# Patient Record
Sex: FEMALE | Race: WHITE | URBAN - METROPOLITAN AREA
[De-identification: names, ages, dates, MRNs, and addresses within clinical notes are randomized per-mention and may not be internally consistent; named-entity substitution may affect disease eponyms.]

---

## 2017-05-02 ENCOUNTER — IMPORTED ENCOUNTER (OUTPATIENT)
Dept: URBAN - METROPOLITAN AREA CLINIC 50 | Facility: CLINIC | Age: 72
End: 2017-05-02

## 2017-05-03 ENCOUNTER — IMPORTED ENCOUNTER (OUTPATIENT)
Dept: URBAN - METROPOLITAN AREA CLINIC 50 | Facility: CLINIC | Age: 72
End: 2017-05-03

## 2017-09-19 ENCOUNTER — IMPORTED ENCOUNTER (OUTPATIENT)
Dept: URBAN - METROPOLITAN AREA CLINIC 50 | Facility: CLINIC | Age: 72
End: 2017-09-19

## 2017-11-15 ENCOUNTER — IMPORTED ENCOUNTER (OUTPATIENT)
Dept: URBAN - METROPOLITAN AREA CLINIC 50 | Facility: CLINIC | Age: 72
End: 2017-11-15

## 2017-12-14 ENCOUNTER — IMPORTED ENCOUNTER (OUTPATIENT)
Dept: URBAN - METROPOLITAN AREA CLINIC 50 | Facility: CLINIC | Age: 72
End: 2017-12-14

## 2018-04-13 ENCOUNTER — IMPORTED ENCOUNTER (OUTPATIENT)
Dept: URBAN - METROPOLITAN AREA CLINIC 50 | Facility: CLINIC | Age: 73
End: 2018-04-13

## 2018-06-07 ENCOUNTER — IMPORTED ENCOUNTER (OUTPATIENT)
Dept: URBAN - METROPOLITAN AREA CLINIC 50 | Facility: CLINIC | Age: 73
End: 2018-06-07

## 2018-06-13 ENCOUNTER — IMPORTED ENCOUNTER (OUTPATIENT)
Dept: URBAN - METROPOLITAN AREA CLINIC 50 | Facility: CLINIC | Age: 73
End: 2018-06-13

## 2018-07-17 ENCOUNTER — IMPORTED ENCOUNTER (OUTPATIENT)
Dept: URBAN - METROPOLITAN AREA CLINIC 50 | Facility: CLINIC | Age: 73
End: 2018-07-17

## 2018-11-07 ENCOUNTER — IMPORTED ENCOUNTER (OUTPATIENT)
Dept: URBAN - METROPOLITAN AREA CLINIC 50 | Facility: CLINIC | Age: 73
End: 2018-11-07

## 2018-12-05 ENCOUNTER — IMPORTED ENCOUNTER (OUTPATIENT)
Dept: URBAN - METROPOLITAN AREA CLINIC 50 | Facility: CLINIC | Age: 73
End: 2018-12-05

## 2018-12-12 ENCOUNTER — TRANSFERRED RECORDS (OUTPATIENT)
Dept: HEALTH INFORMATION MANAGEMENT | Facility: CLINIC | Age: 73
End: 2018-12-12

## 2019-02-11 ENCOUNTER — IMPORTED ENCOUNTER (OUTPATIENT)
Dept: URBAN - METROPOLITAN AREA CLINIC 50 | Facility: CLINIC | Age: 74
End: 2019-02-11

## 2019-02-27 ENCOUNTER — IMPORTED ENCOUNTER (OUTPATIENT)
Dept: URBAN - METROPOLITAN AREA CLINIC 50 | Facility: CLINIC | Age: 74
End: 2019-02-27

## 2019-03-01 ENCOUNTER — TRANSFERRED RECORDS (OUTPATIENT)
Dept: HEALTH INFORMATION MANAGEMENT | Facility: CLINIC | Age: 74
End: 2019-03-01

## 2019-03-25 ENCOUNTER — IMPORTED ENCOUNTER (OUTPATIENT)
Dept: URBAN - METROPOLITAN AREA CLINIC 50 | Facility: CLINIC | Age: 74
End: 2019-03-25

## 2019-05-10 ENCOUNTER — IMPORTED ENCOUNTER (OUTPATIENT)
Dept: URBAN - METROPOLITAN AREA CLINIC 50 | Facility: CLINIC | Age: 74
End: 2019-05-10

## 2019-05-22 ENCOUNTER — IMPORTED ENCOUNTER (OUTPATIENT)
Dept: URBAN - METROPOLITAN AREA CLINIC 50 | Facility: CLINIC | Age: 74
End: 2019-05-22

## 2019-05-30 ENCOUNTER — IMPORTED ENCOUNTER (OUTPATIENT)
Dept: URBAN - METROPOLITAN AREA CLINIC 50 | Facility: CLINIC | Age: 74
End: 2019-05-30

## 2019-10-09 ENCOUNTER — IMPORTED ENCOUNTER (OUTPATIENT)
Dept: URBAN - METROPOLITAN AREA CLINIC 50 | Facility: CLINIC | Age: 74
End: 2019-10-09

## 2019-10-16 ENCOUNTER — IMPORTED ENCOUNTER (OUTPATIENT)
Dept: URBAN - METROPOLITAN AREA CLINIC 50 | Facility: CLINIC | Age: 74
End: 2019-10-16

## 2020-03-06 ENCOUNTER — IMPORTED ENCOUNTER (OUTPATIENT)
Dept: URBAN - METROPOLITAN AREA CLINIC 50 | Facility: CLINIC | Age: 75
End: 2020-03-06

## 2020-04-15 ENCOUNTER — IMPORTED ENCOUNTER (OUTPATIENT)
Dept: URBAN - METROPOLITAN AREA CLINIC 50 | Facility: CLINIC | Age: 75
End: 2020-04-15

## 2020-06-05 ENCOUNTER — IMPORTED ENCOUNTER (OUTPATIENT)
Dept: URBAN - METROPOLITAN AREA CLINIC 50 | Facility: CLINIC | Age: 75
End: 2020-06-05

## 2020-07-01 ENCOUNTER — IMPORTED ENCOUNTER (OUTPATIENT)
Dept: URBAN - METROPOLITAN AREA CLINIC 50 | Facility: CLINIC | Age: 75
End: 2020-07-01

## 2020-08-05 ENCOUNTER — IMPORTED ENCOUNTER (OUTPATIENT)
Dept: URBAN - METROPOLITAN AREA CLINIC 50 | Facility: CLINIC | Age: 75
End: 2020-08-05

## 2020-08-06 ENCOUNTER — IMPORTED ENCOUNTER (OUTPATIENT)
Dept: URBAN - METROPOLITAN AREA CLINIC 50 | Facility: CLINIC | Age: 75
End: 2020-08-06

## 2020-09-24 ENCOUNTER — TRANSFERRED RECORDS (OUTPATIENT)
Dept: HEALTH INFORMATION MANAGEMENT | Facility: CLINIC | Age: 75
End: 2020-09-24

## 2020-10-14 ENCOUNTER — IMPORTED ENCOUNTER (OUTPATIENT)
Dept: URBAN - METROPOLITAN AREA CLINIC 50 | Facility: CLINIC | Age: 75
End: 2020-10-14

## 2020-10-20 ENCOUNTER — IMPORTED ENCOUNTER (OUTPATIENT)
Dept: URBAN - METROPOLITAN AREA CLINIC 50 | Facility: CLINIC | Age: 75
End: 2020-10-20

## 2021-03-03 ENCOUNTER — IMPORTED ENCOUNTER (OUTPATIENT)
Dept: URBAN - METROPOLITAN AREA CLINIC 50 | Facility: CLINIC | Age: 76
End: 2021-03-03

## 2021-03-29 ENCOUNTER — IMPORTED ENCOUNTER (OUTPATIENT)
Dept: URBAN - METROPOLITAN AREA CLINIC 50 | Facility: CLINIC | Age: 76
End: 2021-03-29

## 2021-04-18 ASSESSMENT — PACHYMETRY
OD_CT_UM: 517
OS_CT_UM: 538
OS_CT_UM: 538
OD_CT_UM: 517
OS_CT_UM: 538
OD_CT_UM: 517
OS_CT_UM: 538
OS_CT_UM: 538
OD_CT_UM: 517
OS_CT_UM: 538
OS_CT_UM: 538
OD_CT_UM: 517
OS_CT_UM: 538
OD_CT_UM: 517
OS_CT_UM: 538
OD_CT_UM: 517
OS_CT_UM: 538
OS_CT_UM: 538
OD_CT_UM: 517
OD_CT_UM: 517
OS_CT_UM: 538
OD_CT_UM: 517
OS_CT_UM: 538
OD_CT_UM: 517
OS_CT_UM: 538
OD_CT_UM: 517
OS_CT_UM: 538
OD_CT_UM: 517
OS_CT_UM: 538
OD_CT_UM: 517
OS_CT_UM: 538
OD_CT_UM: 517
OS_CT_UM: 538
OS_CT_UM: 538
OD_CT_UM: 517

## 2021-04-18 ASSESSMENT — TONOMETRY
OS_IOP_MMHG: 17
OD_IOP_MMHG: 14
OS_IOP_MMHG: 12
OD_IOP_MMHG: 14
OD_IOP_MMHG: 16
OD_IOP_MMHG: 13
OS_IOP_MMHG: 14
OD_IOP_MMHG: 13
OS_IOP_MMHG: 14
OS_IOP_MMHG: 13
OD_IOP_MMHG: 12
OD_IOP_MMHG: 17
OS_IOP_MMHG: 12
OD_IOP_MMHG: 13
OS_IOP_MMHG: 15
OD_IOP_MMHG: 18
OD_IOP_MMHG: 13
OS_IOP_MMHG: 16
OD_IOP_MMHG: 12
OS_IOP_MMHG: 13
OD_IOP_MMHG: 14
OD_IOP_MMHG: 13
OS_IOP_MMHG: 12
OD_IOP_MMHG: 14
OD_IOP_MMHG: 18
OD_IOP_MMHG: 13
OD_IOP_MMHG: 14
OD_IOP_MMHG: 15
OD_IOP_MMHG: 15
OD_IOP_MMHG: 16
OS_IOP_MMHG: 15
OD_IOP_MMHG: 15
OD_IOP_MMHG: 17

## 2021-04-18 ASSESSMENT — VISUAL ACUITY
OS_SC: 20/40
OD_CC: 20/30
OD_CC: J1@ 13 IN
OS_SC: 20/50
OD_CC: 20/25-
OS_SC: 20/40-2
OD_PH: @ 17 IN
OS_PH: 20/30-2
OS_SC: 20/30-
OS_SC: 20/40
OS_SC: 20/30
OD_SC: 20/30
OD_CC: J1
OD_PH: @ 13 IN
OD_SC: 20/40
OD_CC: J1+@ 13 IN
OD_CC: J1+
OD_SC: 20/40
OD_PH: 20/25
OD_CC: J1+
OD_SC: 20/40-1
OS_CC: J1
OD_SC: 20/30-1
OD_SC: 20/40+2
OS_SC: 20/40-1
OD_CC: 20/30
OS_PH: 20/25-
OS_CC: 20/30
OS_CC: J1+@ 17 IN
OS_PH: 20/25-2
OS_PH: 20/30
OS_PH: 20/30
OS_CC: 20/30
OD_SC: 20/25-
OS_SC: 20/40-2
OS_CC: J1+
OD_SC: 20/50
OD_SC: 20/30+1
OS_SC: 20/50-2
OS_CC: J1+@ 13 IN
OS_PH: @ 17 IN
OS_CC: 20/25
OS_PH: @ 13 IN
OS_CC: J1@ 13 IN
OD_PH: 20/30+2
OD_SC: 20/30
OD_CC: J1+@ 17 IN
OS_PH: 20/30
OS_CC: J1+
OD_SC: 20/30
OS_SC: 20/30-1
OS_SC: 20/25-1

## 2021-08-09 ENCOUNTER — PREPPED CHART (OUTPATIENT)
Dept: URBAN - METROPOLITAN AREA CLINIC 52 | Facility: CLINIC | Age: 76
End: 2021-08-09

## 2021-08-11 ENCOUNTER — DIAGNOSTIC TESTING ONLY (OUTPATIENT)
Dept: URBAN - METROPOLITAN AREA CLINIC 52 | Facility: CLINIC | Age: 76
End: 2021-08-11

## 2021-08-11 DIAGNOSIS — H47.233: ICD-10-CM

## 2021-08-11 PROCEDURE — 92273 FULL FIELD ERG W/I&R: CPT

## 2021-08-18 ENCOUNTER — DIAGNOSTICS - HVF/OCT (OUTPATIENT)
Dept: URBAN - METROPOLITAN AREA CLINIC 53 | Facility: CLINIC | Age: 76
End: 2021-08-18

## 2021-08-18 DIAGNOSIS — H40.1130: ICD-10-CM

## 2021-08-18 PROCEDURE — 92133 CPTRZD OPH DX IMG PST SGM ON: CPT

## 2021-08-18 PROCEDURE — 92083 EXTENDED VISUAL FIELD XM: CPT

## 2021-08-25 ENCOUNTER — 5 MONTH FOLLOW-UP (OUTPATIENT)
Dept: URBAN - METROPOLITAN AREA CLINIC 53 | Facility: CLINIC | Age: 76
End: 2021-08-25

## 2021-08-25 DIAGNOSIS — H40.1113: ICD-10-CM

## 2021-08-25 DIAGNOSIS — H40.1122: ICD-10-CM

## 2021-08-25 PROCEDURE — 92012 INTRM OPH EXAM EST PATIENT: CPT

## 2021-08-25 RX ORDER — PILOCARPINE HYDROCHLORIDE OPHTHALMIC SOLUTION 10 MG/ML: 1 SOLUTION/ DROPS OPHTHALMIC

## 2021-08-25 ASSESSMENT — VISUAL ACUITY
OD_SC: 20/40
OS_SC: 20/30
OU_SC: 20/30-1
OD_PH: 20/30
OU_SC: J1+

## 2021-08-25 ASSESSMENT — TONOMETRY
OD_IOP_MMHG: 15
OD_IOP_MMHG: 14
OS_IOP_MMHG: 13
OS_IOP_MMHG: 13

## 2021-08-25 NOTE — PATIENT DISCUSSION
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 IOP 13 today with worsening Visual Field. Patient on maxed medical therapy with advanced Visual Field loss. Will switch drop protocol and refer to Dr. Brenda Pride for evaluation of Glaucoma Filtration surgery vs other right eye then left eye. Target IOP: 10-11 OU.

## 2021-08-25 NOTE — PATIENT DISCUSSION
Amended 5/9/22: Patient aware Dr. Surinder Gonzalez is considered OON but she would like to follow up with him at this time.  Will refax referral.

## 2021-08-25 NOTE — PATIENT DISCUSSION
Dr. Katarzyna Valladares is out of network with patient's insurance plan.  Referred to Dr. Bulmaro Calix.

## 2021-08-25 NOTE — PATIENT DISCUSSION
Stop Latanoprost OU QHS, start Pilocarpine OU TID, continue Rhopressa OU QHS, Continue Vyzulta OU QHS, continue Combigan OU BID.

## 2021-08-25 NOTE — PATIENT DISCUSSION
Severe Primary Open Angle Glaucoma right eye s/p ECP OD 9/12/2012. IOP 15 today with worsening Visual Field. Patient on maxed medical therapy with advanced Visual Field loss. Will switch drop protocol and refer to Dr. Maicol Robin for evaluation of Glaucoma Filtration surgery vs other right eye then left eye. Target IOP: 10-11 OU.

## 2021-08-25 NOTE — PATIENT DISCUSSION
Amended Dr. Natasha Bonner does not take patients insurance and is consider OON will refer patient to Dr. Josefina Baxter.

## 2022-05-02 LAB — HEP C HIM: NORMAL

## 2022-06-02 ENCOUNTER — TRANSFERRED RECORDS (OUTPATIENT)
Dept: HEALTH INFORMATION MANAGEMENT | Facility: CLINIC | Age: 77
End: 2022-06-02

## 2022-06-02 LAB — EJECTION FRACTION: NORMAL %

## 2022-06-06 ENCOUNTER — TRANSFERRED RECORDS (OUTPATIENT)
Dept: HEALTH INFORMATION MANAGEMENT | Facility: CLINIC | Age: 77
End: 2022-06-06

## 2022-10-14 ENCOUNTER — TRANSFERRED RECORDS (OUTPATIENT)
Dept: HEALTH INFORMATION MANAGEMENT | Facility: CLINIC | Age: 77
End: 2022-10-14

## 2022-11-29 LAB
ALT SERPL-CCNC: 16 IU/L (ref 0–32)
AST SERPL-CCNC: 21 IU/L (ref 0–40)
CHOLESTEROL (EXTERNAL): 142 MG/DL (ref 100–199)
CREATININE (EXTERNAL): 0.84 MG/DL (ref 0.57–1)
GFR ESTIMATED (EXTERNAL): 71 ML/MIN/1.73
GLUCOSE (EXTERNAL): 93 MG/DL (ref 70–99)
HDLC SERPL-MCNC: 30 MG/DL
LDL CHOLESTEROL CALCULATED (EXTERNAL): 80 MG/DL (ref 0–99)
POTASSIUM (EXTERNAL): 4.5 MMOL/L (ref 3.5–5.2)
TRIGLYCERIDES (EXTERNAL): 186 MG/DL (ref 0–149)
TSH SERPL-ACNC: 3.47 UIU/ML (ref 0.45–4.5)

## 2023-03-02 ENCOUNTER — TRANSFERRED RECORDS (OUTPATIENT)
Dept: HEALTH INFORMATION MANAGEMENT | Facility: CLINIC | Age: 78
End: 2023-03-02

## 2023-06-18 ENCOUNTER — TRANSFERRED RECORDS (OUTPATIENT)
Dept: HEALTH INFORMATION MANAGEMENT | Facility: CLINIC | Age: 78
End: 2023-06-18

## 2023-06-19 ENCOUNTER — TRANSFERRED RECORDS (OUTPATIENT)
Dept: HEALTH INFORMATION MANAGEMENT | Facility: CLINIC | Age: 78
End: 2023-06-19

## 2023-06-19 LAB — EJECTION FRACTION: NORMAL %

## 2023-06-20 ENCOUNTER — TRANSFERRED RECORDS (OUTPATIENT)
Dept: HEALTH INFORMATION MANAGEMENT | Facility: CLINIC | Age: 78
End: 2023-06-20

## 2023-07-02 ENCOUNTER — TRANSFERRED RECORDS (OUTPATIENT)
Dept: HEALTH INFORMATION MANAGEMENT | Facility: CLINIC | Age: 78
End: 2023-07-02

## 2023-07-03 ENCOUNTER — TRANSFERRED RECORDS (OUTPATIENT)
Dept: HEALTH INFORMATION MANAGEMENT | Facility: CLINIC | Age: 78
End: 2023-07-03

## 2023-07-04 ENCOUNTER — TRANSFERRED RECORDS (OUTPATIENT)
Dept: HEALTH INFORMATION MANAGEMENT | Facility: CLINIC | Age: 78
End: 2023-07-04

## 2023-07-06 ENCOUNTER — TRANSFERRED RECORDS (OUTPATIENT)
Dept: HEALTH INFORMATION MANAGEMENT | Facility: CLINIC | Age: 78
End: 2023-07-06

## 2023-07-08 ENCOUNTER — TRANSFERRED RECORDS (OUTPATIENT)
Dept: HEALTH INFORMATION MANAGEMENT | Facility: CLINIC | Age: 78
End: 2023-07-08

## 2023-08-02 ENCOUNTER — TRANSFERRED RECORDS (OUTPATIENT)
Dept: HEALTH INFORMATION MANAGEMENT | Facility: CLINIC | Age: 78
End: 2023-08-02

## 2023-08-07 ENCOUNTER — TRANSFERRED RECORDS (OUTPATIENT)
Dept: HEALTH INFORMATION MANAGEMENT | Facility: CLINIC | Age: 78
End: 2023-08-07

## 2023-08-07 LAB
ALT SERPL-CCNC: 23 IU/L (ref 0–32)
AST SERPL-CCNC: 21 IU/L (ref 0–40)
CHOLESTEROL (EXTERNAL): 151 MG/DL (ref 100–199)
CREATININE (EXTERNAL): 0.66 MG/DL (ref 0.57–1)
GFR ESTIMATED (EXTERNAL): 89 ML/MIN/1.73
GLUCOSE (EXTERNAL): 100 MG/DL (ref 70–99)
HDLC SERPL-MCNC: 37 MG/DL
LDL CHOLESTEROL CALCULATED (EXTERNAL): 90 MG/DL (ref 0–99)
POTASSIUM (EXTERNAL): 4.2 MMOL/L (ref 3.5–5.2)
TRIGLYCERIDES (EXTERNAL): 133 MG/DL (ref 0–149)
TSH SERPL-ACNC: 3.74 UIU/ML (ref 0.45–4.5)

## 2023-08-17 ENCOUNTER — TRANSFERRED RECORDS (OUTPATIENT)
Dept: HEALTH INFORMATION MANAGEMENT | Facility: CLINIC | Age: 78
End: 2023-08-17

## 2023-09-07 ENCOUNTER — TRANSFERRED RECORDS (OUTPATIENT)
Dept: HEALTH INFORMATION MANAGEMENT | Facility: CLINIC | Age: 78
End: 2023-09-07

## 2023-09-10 ENCOUNTER — TRANSFERRED RECORDS (OUTPATIENT)
Dept: HEALTH INFORMATION MANAGEMENT | Facility: CLINIC | Age: 78
End: 2023-09-10

## 2023-09-20 ENCOUNTER — TRANSFERRED RECORDS (OUTPATIENT)
Dept: HEALTH INFORMATION MANAGEMENT | Facility: CLINIC | Age: 78
End: 2023-09-20

## 2023-10-02 ENCOUNTER — OFFICE VISIT (OUTPATIENT)
Dept: FAMILY MEDICINE | Facility: CLINIC | Age: 78
End: 2023-10-02
Payer: COMMERCIAL

## 2023-10-02 VITALS
TEMPERATURE: 97.8 F | WEIGHT: 156 LBS | SYSTOLIC BLOOD PRESSURE: 164 MMHG | OXYGEN SATURATION: 92 % | HEART RATE: 76 BPM | RESPIRATION RATE: 18 BRPM | DIASTOLIC BLOOD PRESSURE: 80 MMHG

## 2023-10-02 DIAGNOSIS — B35.1 ONYCHOMYCOSIS: ICD-10-CM

## 2023-10-02 DIAGNOSIS — M54.6 ACUTE RIGHT-SIDED THORACIC BACK PAIN: Primary | ICD-10-CM

## 2023-10-02 DIAGNOSIS — R31.21 ASYMPTOMATIC MICROSCOPIC HEMATURIA: ICD-10-CM

## 2023-10-02 LAB
ALBUMIN UR-MCNC: NEGATIVE MG/DL
APPEARANCE UR: CLEAR
BACTERIA #/AREA URNS HPF: ABNORMAL /HPF
BILIRUB UR QL STRIP: NEGATIVE
COLOR UR AUTO: YELLOW
GLUCOSE UR STRIP-MCNC: NEGATIVE MG/DL
HGB UR QL STRIP: ABNORMAL
KETONES UR STRIP-MCNC: NEGATIVE MG/DL
LEUKOCYTE ESTERASE UR QL STRIP: ABNORMAL
NITRATE UR QL: NEGATIVE
PH UR STRIP: 7 [PH] (ref 5–8)
RBC #/AREA URNS AUTO: ABNORMAL /HPF
SP GR UR STRIP: 1.01 (ref 1–1.03)
SQUAMOUS #/AREA URNS AUTO: ABNORMAL /LPF
UROBILINOGEN UR STRIP-ACNC: 0.2 E.U./DL
WBC #/AREA URNS AUTO: ABNORMAL /HPF

## 2023-10-02 PROCEDURE — 81001 URINALYSIS AUTO W/SCOPE: CPT | Performed by: PHYSICIAN ASSISTANT

## 2023-10-02 PROCEDURE — 99203 OFFICE O/P NEW LOW 30 MIN: CPT | Performed by: PHYSICIAN ASSISTANT

## 2023-10-02 RX ORDER — CHOLECALCIFEROL (VITAMIN D3) 50 MCG
1 TABLET ORAL 2 TIMES DAILY
COMMUNITY

## 2023-10-02 RX ORDER — RALOXIFENE HYDROCHLORIDE 60 MG/1
60 TABLET, FILM COATED ORAL DAILY
COMMUNITY
End: 2023-12-22

## 2023-10-02 RX ORDER — MULTIPLE VITAMINS W/ MINERALS TAB 9MG-400MCG
1 TAB ORAL DAILY
COMMUNITY

## 2023-10-02 RX ORDER — NETARSUDIL 0.2 MG/ML
1 SOLUTION/ DROPS OPHTHALMIC; TOPICAL AT BEDTIME
COMMUNITY

## 2023-10-02 RX ORDER — AMPICILLIN TRIHYDRATE 250 MG
600 CAPSULE ORAL DAILY
COMMUNITY

## 2023-10-02 RX ORDER — ACETAMINOPHEN 500 MG
500-1000 TABLET ORAL EVERY 6 HOURS PRN
COMMUNITY

## 2023-10-02 RX ORDER — OMEGA-3/DHA/EPA/FISH OIL 60 MG-90MG
1 CAPSULE ORAL 2 TIMES DAILY
COMMUNITY

## 2023-10-02 RX ORDER — PILOCARPINE HYDROCHLORIDE 10 MG/ML
1-2 SOLUTION/ DROPS OPHTHALMIC 4 TIMES DAILY
COMMUNITY
End: 2023-12-22

## 2023-10-02 RX ORDER — METOPROLOL TARTRATE 25 MG/1
50 TABLET, FILM COATED ORAL 2 TIMES DAILY
COMMUNITY
End: 2024-01-22

## 2023-10-02 RX ORDER — LATANOPROST 50 UG/ML
1 SOLUTION/ DROPS OPHTHALMIC DAILY
COMMUNITY

## 2023-10-02 RX ORDER — UBIDECARENONE 30 MG
1 CAPSULE ORAL DAILY
COMMUNITY

## 2023-10-02 RX ORDER — FENOFIBRATE 160 MG/1
160 TABLET ORAL DAILY
COMMUNITY
End: 2024-08-19

## 2023-10-02 RX ORDER — DILTIAZEM HYDROCHLORIDE 30 MG/1
30 TABLET, FILM COATED ORAL 4 TIMES DAILY
COMMUNITY
End: 2023-12-22

## 2023-10-02 RX ORDER — VITAMIN E (DL,TOCOPHERYL ACET) 90 MG
200 CAPSULE ORAL DAILY
COMMUNITY

## 2023-10-02 RX ORDER — ASPIRIN 81 MG/1
81 TABLET, CHEWABLE ORAL DAILY
COMMUNITY
End: 2023-12-27

## 2023-10-02 RX ORDER — LEVOTHYROXINE SODIUM 50 UG/1
50 TABLET ORAL DAILY
COMMUNITY
End: 2024-08-19

## 2023-10-02 RX ORDER — TRAZODONE HYDROCHLORIDE 150 MG/1
150 TABLET ORAL AT BEDTIME
COMMUNITY
End: 2023-12-22

## 2023-10-02 RX ORDER — BRIMONIDINE TARTRATE 2 MG/ML
1 SOLUTION/ DROPS OPHTHALMIC 3 TIMES DAILY
COMMUNITY

## 2023-10-02 NOTE — PROGRESS NOTES
Chief Complaint   Patient presents with    Back Pain     Mid back muscle spasms         ASSESSMENT/PLAN:  Abbey was seen today for back pain.    Diagnoses and all orders for this visit:    Acute right-sided thoracic back pain  -     UA Macroscopic with reflex to Microscopic and Culture - Clinic Collect  -     UA Microscopic with Reflex to Culture    Onychomycosis  -     Orthopedic  Referral; Future    Asymptomatic microscopic hematuria  -     Primary Care Referral; Future    Patient new to [place] and will refer to primary care to establish care and also to follow-up with the asymptomatic hematuria found on exam today.  To podiatry for onychomycosis    Patient is UA is not concerning for UTI but does have quite a bit of blood that is microscopic.  She is on Xarelto.  I doubt PE given the lack of tachycardia and shortness of breath.  She has reproducible pain in the right mid back which is quite suggestive of musculoskeletal cause.  Did discuss potentially doing a CT scan to rule out kidney stone however I would expect her feeling significantly more pain than she is currently.  We agreed to manage conservatively with Tylenol, heat, warm bath, massage, gentle range of motion and also have her follow-up with PT  If she has any new or worsening symptoms please return to clinic    Heriberto Patton PA-C      SUBJECTIVE:  Abbey is a 88 year old female who presents to urgent care with about 2 days of right upper back pain.  She just flew in and moved here from [place] to be closer to family.  During this summer she fell and broke her hip and had complications with pulmonary embolism after.  She has been on Xarelto.  No shortness of breath or chest pain.  Pain started during the flight and she pointed to being in a very uncomfortable wheelchair.  Since then she will get spasms of pain with some low-level underlying pain in general.  It is causing her to be more worn out.  No fevers or chills.  No urinary  symptoms.  No abdominal pain, nausea or vomiting.    ROS: Pertinent ROS neg other than the symptoms noted above in the HPI.     OBJECTIVE:  BP (!) 164/80 (BP Location: Right arm, Patient Position: Sitting, Cuff Size: Adult Regular)   Pulse 76   Temp 97.8  F (36.6  C) (Oral)   Resp 18   Wt 70.8 kg (156 lb)   SpO2 92%    GENERAL: healthy, alert and no distress  RESP: lungs clear to auscultation - no rales, rhonchi or wheezes  CV: regular rate and rhythm, normal S1 S2, no S3 or S4, no murmur, click or rub, no peripheral edema and peripheral pulses strong  MS: no gross musculoskeletal defects noted, no edema  SKIN: no suspicious lesions or rashes  BACK: No midline tenderness, right mid thoracic back has some hypertonicity of the muscles with focal tenderness reproduces the pain that brings her in, no CVA tenderness    DIAGNOSTICS    Results for orders placed or performed in visit on 10/02/23   UA Macroscopic with reflex to Microscopic and Culture - Clinic Collect     Status: Abnormal    Specimen: Urine, Clean Catch   Result Value Ref Range    Color Urine Yellow Colorless, Straw, Light Yellow, Yellow    Appearance Urine Clear Clear    Glucose Urine Negative Negative mg/dL    Bilirubin Urine Negative Negative    Ketones Urine Negative Negative mg/dL    Specific Gravity Urine 1.010 1.005 - 1.030    Blood Urine Large (A) Negative    pH Urine 7.0 5.0 - 8.0    Protein Albumin Urine Negative Negative mg/dL    Urobilinogen Urine 0.2 0.2, 1.0 E.U./dL    Nitrite Urine Negative Negative    Leukocyte Esterase Urine Small (A) Negative   UA Microscopic with Reflex to Culture     Status: Abnormal   Result Value Ref Range    Bacteria Urine Few (A) None Seen /HPF    RBC Urine  (A) 0-2 /HPF /HPF    WBC Urine 0-5 0-5 /HPF /HPF    Squamous Epithelials Urine Few (A) None Seen /LPF    Narrative    Urine Culture not indicated        Current Outpatient Medications   Medication    acetaminophen (TYLENOL) 500 MG tablet    apixaban  ANTICOAGULANT (ELIQUIS) 2.5 MG tablet    aspirin (ASA) 81 MG chewable tablet    brimonidine (ALPHAGAN) 0.2 % ophthalmic solution    calcium carbonate (OS-TAMIKO) 1500 (600 Ca) MG tablet    cholecalciferol (VITAMIN D3) 25 mcg (1000 units) capsule    coenzyme Q-10 capsule    diltiazem (CARDIZEM) 30 MG tablet    fenofibrate (TRIGLIDE/LOFIBRA) 160 MG tablet    fish oil-omega-3 fatty acids 500 MG capsule    latanoprost (XALATAN) 0.005 % ophthalmic solution    levothyroxine (SYNTHROID/LEVOTHROID) 50 MCG tablet    metoprolol tartrate (LOPRESSOR) 25 MG tablet    multivitamin w/minerals (THERA-VIT-M) tablet    netarsudil (RHOPRESSA) 0.02 % ophthalmic solution    pilocarpine (PILOCAR) 1 % ophthalmic solution    raloxifene (EVISTA) 60 MG tablet    red yeast rice 600 MG CAPS    traZODone (DESYREL) 150 MG tablet    vitamin D3 (CHOLECALCIFEROL) 50 mcg (2000 units) tablet    Vitamin E 90 MG (200 UNIT) capsule     No current facility-administered medications for this visit.      There is no problem list on file for this patient.     No past medical history on file.  No past surgical history on file.  No family history on file.  Social History     Tobacco Use    Smoking status: Not on file    Smokeless tobacco: Not on file   Substance Use Topics    Alcohol use: Not on file              The plan of care was discussed with the patient. They understand and agree with the course of treatment prescribed. A printed summary was given including instructions and medications.  The use of Dragon/[employer] dictation services may have been used to construct the content in this note; any grammatical or spelling errors are non-intentional. Please contact the author of this note directly if you are in need of any clarification.

## 2023-10-02 NOTE — PATIENT INSTRUCTIONS
Your exam is very consistent with a muscle strain.  I recommend heating the area with warm compresses, heating pads or taking long warm baths with Epsom salt and magnesium  You can use Tylenol 500 to 1000 mg up to 3 times a day as needed for pain  Massage the area, gentle range of motion and stretching can be helpful as well    Please bring back the urine sample so we can test this.  It is important to rule out other causes of back pain

## 2023-10-22 ENCOUNTER — HEALTH MAINTENANCE LETTER (OUTPATIENT)
Age: 78
End: 2023-10-22

## 2023-12-22 ENCOUNTER — OFFICE VISIT (OUTPATIENT)
Dept: FAMILY MEDICINE | Facility: CLINIC | Age: 78
End: 2023-12-22
Payer: COMMERCIAL

## 2023-12-22 VITALS
HEART RATE: 76 BPM | TEMPERATURE: 97.9 F | DIASTOLIC BLOOD PRESSURE: 108 MMHG | HEIGHT: 65 IN | RESPIRATION RATE: 20 BRPM | OXYGEN SATURATION: 97 % | SYSTOLIC BLOOD PRESSURE: 181 MMHG | WEIGHT: 160.1 LBS | BODY MASS INDEX: 26.67 KG/M2

## 2023-12-22 DIAGNOSIS — E03.9 HYPOTHYROIDISM, UNSPECIFIED TYPE: Primary | ICD-10-CM

## 2023-12-22 DIAGNOSIS — G47.00 INSOMNIA, UNSPECIFIED TYPE: ICD-10-CM

## 2023-12-22 DIAGNOSIS — M81.0 AGE-RELATED OSTEOPOROSIS WITHOUT CURRENT PATHOLOGICAL FRACTURE: ICD-10-CM

## 2023-12-22 DIAGNOSIS — I26.99 ACUTE PULMONARY EMBOLISM WITHOUT ACUTE COR PULMONALE, UNSPECIFIED PULMONARY EMBOLISM TYPE (H): ICD-10-CM

## 2023-12-22 DIAGNOSIS — I48.20 CHRONIC ATRIAL FIBRILLATION (H): ICD-10-CM

## 2023-12-22 LAB
ALBUMIN SERPL BCG-MCNC: 4 G/DL (ref 3.5–5.2)
ALBUMIN UR-MCNC: ABNORMAL MG/DL
ALP SERPL-CCNC: 68 U/L (ref 40–150)
ALT SERPL W P-5'-P-CCNC: 16 U/L (ref 0–50)
AMORPH CRY #/AREA URNS HPF: ABNORMAL /HPF
ANION GAP SERPL CALCULATED.3IONS-SCNC: 9 MMOL/L (ref 7–15)
APPEARANCE UR: ABNORMAL
AST SERPL W P-5'-P-CCNC: 22 U/L (ref 0–45)
BACTERIA #/AREA URNS HPF: ABNORMAL /HPF
BILIRUB SERPL-MCNC: 0.3 MG/DL
BILIRUB UR QL STRIP: NEGATIVE
BUN SERPL-MCNC: 23.9 MG/DL (ref 8–23)
CALCIUM SERPL-MCNC: 10 MG/DL (ref 8.8–10.2)
CHLORIDE SERPL-SCNC: 100 MMOL/L (ref 98–107)
CHOLEST SERPL-MCNC: 149 MG/DL
COLOR UR AUTO: YELLOW
CREAT SERPL-MCNC: 0.64 MG/DL (ref 0.51–0.95)
DEPRECATED HCO3 PLAS-SCNC: 28 MMOL/L (ref 22–29)
EGFRCR SERPLBLD CKD-EPI 2021: 89 ML/MIN/1.73M2
ERYTHROCYTE [DISTWIDTH] IN BLOOD BY AUTOMATED COUNT: 13.3 % (ref 10–15)
FASTING STATUS PATIENT QL REPORTED: ABNORMAL
GLUCOSE SERPL-MCNC: 102 MG/DL (ref 70–99)
GLUCOSE UR STRIP-MCNC: NEGATIVE MG/DL
HCT VFR BLD AUTO: 42.1 % (ref 35–47)
HDLC SERPL-MCNC: 32 MG/DL
HGB BLD-MCNC: 14.8 G/DL (ref 11.7–15.7)
HGB UR QL STRIP: ABNORMAL
KETONES UR STRIP-MCNC: NEGATIVE MG/DL
LDLC SERPL CALC-MCNC: 69 MG/DL
LEUKOCYTE ESTERASE UR QL STRIP: ABNORMAL
MCH RBC QN AUTO: 34.3 PG (ref 26.5–33)
MCHC RBC AUTO-ENTMCNC: 35.2 G/DL (ref 31.5–36.5)
MCV RBC AUTO: 98 FL (ref 78–100)
NITRATE UR QL: NEGATIVE
NONHDLC SERPL-MCNC: 117 MG/DL
PH UR STRIP: 7 [PH] (ref 5–8)
PLATELET # BLD AUTO: 363 10E3/UL (ref 150–450)
POTASSIUM SERPL-SCNC: 4.3 MMOL/L (ref 3.4–5.3)
PROT SERPL-MCNC: 6.5 G/DL (ref 6.4–8.3)
RBC # BLD AUTO: 4.32 10E6/UL (ref 3.8–5.2)
RBC #/AREA URNS AUTO: ABNORMAL /HPF
SODIUM SERPL-SCNC: 137 MMOL/L (ref 135–145)
SP GR UR STRIP: 1.01 (ref 1–1.03)
SQUAMOUS #/AREA URNS AUTO: ABNORMAL /LPF
TRIGL SERPL-MCNC: 239 MG/DL
UROBILINOGEN UR STRIP-ACNC: 0.2 E.U./DL
WBC # BLD AUTO: 8.6 10E3/UL (ref 4–11)
WBC #/AREA URNS AUTO: ABNORMAL /HPF

## 2023-12-22 PROCEDURE — 80061 LIPID PANEL: CPT | Performed by: FAMILY MEDICINE

## 2023-12-22 PROCEDURE — 99214 OFFICE O/P EST MOD 30 MIN: CPT | Performed by: FAMILY MEDICINE

## 2023-12-22 PROCEDURE — 81001 URINALYSIS AUTO W/SCOPE: CPT | Performed by: FAMILY MEDICINE

## 2023-12-22 PROCEDURE — 80053 COMPREHEN METABOLIC PANEL: CPT | Performed by: FAMILY MEDICINE

## 2023-12-22 PROCEDURE — 85027 COMPLETE CBC AUTOMATED: CPT | Performed by: FAMILY MEDICINE

## 2023-12-22 PROCEDURE — 36415 COLL VENOUS BLD VENIPUNCTURE: CPT | Performed by: FAMILY MEDICINE

## 2023-12-22 RX ORDER — TRAZODONE HYDROCHLORIDE 150 MG/1
300-450 TABLET ORAL AT BEDTIME
Qty: 90 TABLET | Refills: 0 | Status: SHIPPED | OUTPATIENT
Start: 2023-12-22 | End: 2024-01-22

## 2023-12-22 RX ORDER — RESPIRATORY SYNCYTIAL VIRUS VACCINE 120MCG/0.5
0.5 KIT INTRAMUSCULAR ONCE
Qty: 1 EACH | Refills: 0 | Status: CANCELLED | OUTPATIENT
Start: 2023-12-22 | End: 2023-12-22

## 2023-12-22 RX ORDER — RALOXIFENE HYDROCHLORIDE 60 MG/1
60 TABLET, FILM COATED ORAL DAILY
Qty: 90 TABLET | Refills: 1 | Status: SHIPPED | OUTPATIENT
Start: 2023-12-22 | End: 2024-06-21

## 2023-12-22 ASSESSMENT — PAIN SCALES - GENERAL: PAINLEVEL: NO PAIN (0)

## 2023-12-22 NOTE — PROGRESS NOTES
"  Assessment & Plan     Hypothyroidism, unspecified type  Chronic, stable on 50 mcg per day. Consider recheck if continued symptoms.   - Lipid panel reflex to direct LDL Non-fasting  - CBC with platelets  - Comprehensive metabolic panel (BMP + Alb, Alk Phos, ALT, AST, Total. Bili, TP)  - UA Macroscopic with reflex to Microscopic and Culture - Lab Collect  - Lipid panel reflex to direct LDL Non-fasting  - CBC with platelets  - Comprehensive metabolic panel (BMP + Alb, Alk Phos, ALT, AST, Total. Bili, TP)  - UA Macroscopic with reflex to Microscopic and Culture - Lab Collect  - UA Microscopic with Reflex to Culture    Insomnia, unspecified type  Chronic, stable. Refills as below.   - REVIEW OF HEALTH MAINTENANCE PROTOCOL ORDERS  - traZODone (DESYREL) 150 MG tablet  Dispense: 90 tablet; Refill: 0    Chronic atrial fibrillation (H)  Chronic, stable. Recommend follow up with cardiology.   - Adult Cardiology Eval  Referral    Age-related osteoporosis without current pathological fracture  Chronic, stable. Has had recent DEXA scan one year ago with improvement.   - raloxifene (EVISTA) 60 MG tablet  Dispense: 90 tablet; Refill: 1    Acute pulmonary embolism without acute cor pulmonale, unspecified pulmonary embolism type (H)  Acute, hemodynamically stable. On eliquis 5 mg PO Q12H. Continue to follow.       Ordering of each unique test  Prescription drug management  I spent a total of 27 minutes on the day of the visit.   Time spent by me doing chart review, history and exam, documentation and further activities per the note       BMI:   Estimated body mass index is 27.06 kg/m  as calculated from the following:    Height as of this encounter: 1.638 m (5' 4.5\").    Weight as of this encounter: 72.6 kg (160 lb 1.6 oz).       See Patient Instructions    DO DUSTIN MAT[facility] BANDAR Potter is a 78 year old, presenting for the following health issues:  Establish Care and " "Hypertension        12/22/2023     1:00 PM   Additional Questions   Roomed by SADIE Stock   Accompanied by Daughter Hanna       History of Present Illness       Hypertension: She presents for follow up of hypertension.  She does not check blood pressure  regularly outside of the clinic. Outside blood pressures have been over 140/90. She does not follow a low salt diet.     Reason for visit:  Moved - establish new care, med refills, osteoporosis, cardiologist follow up referral    She eats 0-1 servings of fruits and vegetables daily.She consumes 0 sweetened beverage(s) daily.She exercises with enough effort to increase her heart rate 9 or less minutes per day.  She exercises with enough effort to increase her heart rate 3 or less days per week.   She is taking medications regularly.      Osteoporosis- patient broke hip in June. She was recommended to refer to a bone provider. She was previously managed by her primary care provider. Patient denies any side effects or symptoms. Patient also has another medication, she cannot recall the name but that it is an infusion that occurs every two years.     Insomnia- Patient has tried melatonin with no relief. She was up to 150-450 mg per night of trazodone. No side effects. She can fall asleep relatively soon, but has difficulty maintaining sleep.     History of orthostatic hypotension. Was told her normal is 150-160/90 mmHg.     Review of Systems   Constitutional, HEENT, cardiovascular, pulmonary, gi and gu systems are negative, except as otherwise noted.      Objective    BP (!) 181/108 (BP Location: Right arm, Patient Position: Sitting, Cuff Size: Adult Regular)   Pulse 76   Temp 97.9  F (36.6  C) (Oral)   Resp 20   Ht 1.638 m (5' 4.5\")   Wt 72.6 kg (160 lb 1.6 oz)   LMP  (LMP Unknown)   SpO2 97%   BMI 27.06 kg/m    Body mass index is 27.06 kg/m .  Physical Exam   GENERAL: healthy, alert and no distress  EYES: Eyes grossly normal to inspection, PERRL and " conjunctivae and sclerae normal  NECK: no adenopathy, no asymmetry, masses, or scars and thyroid normal to palpation  RESP: lungs clear to auscultation - no rales, rhonchi or wheezes  CV: regular rate and rhythm, normal S1 S2, no S3 or S4, no murmur, click or rub, no peripheral edema and peripheral pulses strong  MS: no gross musculoskeletal defects noted, no edema  PSYCH: mentation appears normal, affect normal/bright    Results for orders placed or performed in visit on 12/22/23   Lipid panel reflex to direct LDL Non-fasting     Status: Abnormal   Result Value Ref Range    Cholesterol 149 <200 mg/dL    Triglycerides 239 (H) <150 mg/dL    Direct Measure HDL 32 (L) >=50 mg/dL    LDL Cholesterol Calculated 69 <=100 mg/dL    Non HDL Cholesterol 117 <130 mg/dL    Patient Fasting > 8hrs? Unknown     Narrative    Cholesterol  Desirable:  <200 mg/dL    Triglycerides  Normal:  Less than 150 mg/dL  Borderline High:  150-199 mg/dL  High:  200-499 mg/dL  Very High:  Greater than or equal to 500 mg/dL    Direct Measure HDL  Female:  Greater than or equal to 50 mg/dL   Male:  Greater than or equal to 40 mg/dL    LDL Cholesterol  Desirable:  <100mg/dL  Above Desirable:  100-129 mg/dL   Borderline High:  130-159 mg/dL   High:  160-189 mg/dL   Very High:  >= 190 mg/dL    Non HDL Cholesterol  Desirable:  130 mg/dL  Above Desirable:  130-159 mg/dL  Borderline High:  160-189 mg/dL  High:  190-219 mg/dL  Very High:  Greater than or equal to 220 mg/dL   CBC with platelets     Status: Abnormal   Result Value Ref Range    WBC Count 8.6 4.0 - 11.0 10e3/uL    RBC Count 4.32 3.80 - 5.20 10e6/uL    Hemoglobin 14.8 11.7 - 15.7 g/dL    Hematocrit 42.1 35.0 - 47.0 %    MCV 98 78 - 100 fL    MCH 34.3 (H) 26.5 - 33.0 pg    MCHC 35.2 31.5 - 36.5 g/dL    RDW 13.3 10.0 - 15.0 %    Platelet Count 363 150 - 450 10e3/uL   Comprehensive metabolic panel (BMP + Alb, Alk Phos, ALT, AST, Total. Bili, TP)     Status: Abnormal   Result Value Ref Range     Sodium 137 135 - 145 mmol/L    Potassium 4.3 3.4 - 5.3 mmol/L    Carbon Dioxide (CO2) 28 22 - 29 mmol/L    Anion Gap 9 7 - 15 mmol/L    Urea Nitrogen 23.9 (H) 8.0 - 23.0 mg/dL    Creatinine 0.64 0.51 - 0.95 mg/dL    GFR Estimate 89 >60 mL/min/1.73m2    Calcium 10.0 8.8 - 10.2 mg/dL    Chloride 100 98 - 107 mmol/L    Glucose 102 (H) 70 - 99 mg/dL    Alkaline Phosphatase 68 40 - 150 U/L    AST 22 0 - 45 U/L    ALT 16 0 - 50 U/L    Protein Total 6.5 6.4 - 8.3 g/dL    Albumin 4.0 3.5 - 5.2 g/dL    Bilirubin Total 0.3 <=1.2 mg/dL   UA Macroscopic with reflex to Microscopic and Culture - Lab Collect     Status: Abnormal    Specimen: Urine, Clean Catch   Result Value Ref Range    Color Urine Yellow Colorless, Straw, Light Yellow, Yellow    Appearance Urine Slightly Cloudy (A) Clear    Glucose Urine Negative Negative mg/dL    Bilirubin Urine Negative Negative    Ketones Urine Negative Negative mg/dL    Specific Gravity Urine 1.015 1.005 - 1.030    Blood Urine Large (A) Negative    pH Urine 7.0 5.0 - 8.0    Protein Albumin Urine Trace (A) Negative mg/dL    Urobilinogen Urine 0.2 0.2, 1.0 E.U./dL    Nitrite Urine Negative Negative    Leukocyte Esterase Urine Trace (A) Negative   UA Microscopic with Reflex to Culture     Status: Abnormal   Result Value Ref Range    Bacteria Urine Few (A) None Seen /HPF    RBC Urine  (A) 0-2 /HPF /HPF    WBC Urine 0-5 0-5 /HPF /HPF    Squamous Epithelials Urine Few (A) None Seen /LPF    Amorphous Crystals Urine Few (A) None Seen /HPF    Narrative    Urine Culture not indicated

## 2023-12-22 NOTE — PATIENT INSTRUCTIONS
If you will be taking excederin, hold the vitamin E and omega-3 fish oil as that is blood thinning.   Stop the baby aspirin.     It was very nice to meet you. Please message me with a refill request two weeks ahead of time if possible.    If you have questions, please send us a message or call the clinic and I will be happy to have some one reach out.

## 2023-12-26 NOTE — RESULT ENCOUNTER NOTE
Hello -    Here are my comments about the recent results. All of your labs look excellent. There is no sign of low blood count, which I worry about in my geriatric patients.     There is some repeated blood in your urine. Did you mention you were working with a urologist on this? If not, I would like to send you, just to make sure we are not missing anything dire.     Please let us know if you have any questions or concerns.    Regards,  CATARINA ZAVALA, DO

## 2024-01-22 DIAGNOSIS — I48.20 CHRONIC ATRIAL FIBRILLATION (H): Primary | ICD-10-CM

## 2024-01-22 DIAGNOSIS — G47.00 INSOMNIA, UNSPECIFIED TYPE: ICD-10-CM

## 2024-01-22 NOTE — TELEPHONE ENCOUNTER
Medication Question or Refill        What medication are you calling about (include dose and sig)?:   trazadone 150 mg, metoprolol succinate 50mg tab      Preferred Pharmacy:   [id number] [address] AT [place] [address]  [address] [place] [id number]  Phone: [phone] Fax: [phone]      Controlled Substance Agreement on file:   CSA -- Patient Level:    CSA: None found at the patient level.       Who prescribed the medication?: Michelle Thompson, DO     Do you need a refill? Yes    When did you use the medication last? This morning    Patient offered an appointment? No    Do you have any questions or concerns?  No      Could we send this information to you in [facility] or would you prefer to receive a phone call?:   Patient would prefer a phone call   Okay to leave a detailed message?: Yes at Cell number on file:    Telephone Information:   Mobile [phone]

## 2024-01-23 RX ORDER — METOPROLOL TARTRATE 25 MG/1
50 TABLET, FILM COATED ORAL 2 TIMES DAILY
Qty: 180 TABLET | Refills: 0 | Status: SHIPPED | OUTPATIENT
Start: 2024-01-23 | End: 2024-01-30

## 2024-01-23 RX ORDER — TRAZODONE HYDROCHLORIDE 150 MG/1
300-450 TABLET ORAL AT BEDTIME
Qty: 90 TABLET | Refills: 0 | Status: SHIPPED | OUTPATIENT
Start: 2024-01-23 | End: 2024-03-25

## 2024-01-26 ENCOUNTER — TELEPHONE (OUTPATIENT)
Dept: INTERNAL MEDICINE | Facility: CLINIC | Age: 79
End: 2024-01-26
Payer: COMMERCIAL

## 2024-01-26 DIAGNOSIS — I48.20 CHRONIC ATRIAL FIBRILLATION (H): Primary | ICD-10-CM

## 2024-01-26 RX ORDER — METOPROLOL TARTRATE 50 MG
50 TABLET ORAL DAILY
Qty: 90 TABLET | Refills: 3 | Status: CANCELLED | OUTPATIENT
Start: 2024-01-26

## 2024-01-26 NOTE — TELEPHONE ENCOUNTER
Relayed MARK Barrera, message.    Pt states she would like to continue with 50mg tablet daily, as this is what she was taking prior. Pt would like a new prescription sent in so she does not have to take 2 25mg tablets daily.

## 2024-01-26 NOTE — TELEPHONE ENCOUNTER
Verbal order from MARK Barrera to keep patient at 25mg BID, as 50mg daily would only cover 12 hours, until PCP is back to review.     No where in chart is it listed that pt is on 50mg daily.     Pt ok to  25mg prescription and take BID until PCP can review.     No further questions at this time

## 2024-01-26 NOTE — TELEPHONE ENCOUNTER
Please call: It looks like it was entered in the computer as twice daily when transcribed. It does not appear we wanted to change the dose so if she was taking only one per day continue with this. I will have Dr. Thompson review when she is back to make sure she agrees.

## 2024-01-26 NOTE — TELEPHONE ENCOUNTER
Pt calling stating that the metoprolol tartrate (LOPRESSOR) 25 MG tablet is wrong, she stated it says to take 2 daily but she stated she has only taken 1. Please call pt back once reviewed.

## 2024-01-29 ENCOUNTER — MYC MEDICAL ADVICE (OUTPATIENT)
Dept: FAMILY MEDICINE | Facility: CLINIC | Age: 79
End: 2024-01-29
Payer: COMMERCIAL

## 2024-01-29 DIAGNOSIS — I48.20 CHRONIC ATRIAL FIBRILLATION (H): ICD-10-CM

## 2024-01-30 RX ORDER — METOPROLOL SUCCINATE 50 MG/1
50 TABLET, EXTENDED RELEASE ORAL DAILY
Qty: 90 TABLET | Refills: 1 | Status: SHIPPED | OUTPATIENT
Start: 2024-01-30

## 2024-02-01 DIAGNOSIS — G47.00 INSOMNIA, UNSPECIFIED TYPE: ICD-10-CM

## 2024-02-01 RX ORDER — TRAZODONE HYDROCHLORIDE 150 MG/1
300-450 TABLET ORAL AT BEDTIME
Qty: 90 TABLET | Refills: 0 | OUTPATIENT
Start: 2024-02-01

## 2024-02-05 RX ORDER — METOPROLOL SUCCINATE 50 MG/1
50 TABLET, EXTENDED RELEASE ORAL DAILY
Qty: 90 TABLET | Refills: 0 | Status: SHIPPED | OUTPATIENT
Start: 2024-02-05 | End: 2024-04-08

## 2024-02-20 ENCOUNTER — TELEPHONE (OUTPATIENT)
Dept: FAMILY MEDICINE | Facility: CLINIC | Age: 79
End: 2024-02-20
Payer: COMMERCIAL

## 2024-02-20 DIAGNOSIS — H52.4 PRESBYOPIA: Primary | ICD-10-CM

## 2024-02-20 NOTE — TELEPHONE ENCOUNTER
Order/Referral Request    Who is requesting: Insurance    Orders being requested: Retro active referral for 11/28/2023 appt and referral for upcoming appt (04/10/2024) from PCP    Reason service is needed/diagnosis: Claims denied by insurance and were advised to get referral from PCP    When are orders needed by: ASAP    Has this been discussed with Provider: No    Does patient have a preference on a Group/Provider/Facility? [facility]    Does patient have an appointment scheduled?: No    Where to send orders: Fax    ATTN: Kate    Could we send this information to you in [facility] or would you prefer to receive a phone call?:   Patient would prefer a phone call   Okay to leave a detailed message?: Yes at Other phone number:  [phone] extension [phone]

## 2024-02-20 NOTE — TELEPHONE ENCOUNTER
Patient would like referral placed for previous eye appointment. Are you willing to sign referral?

## 2024-02-27 NOTE — TELEPHONE ENCOUNTER
Abby with [facility] received referral for patient's April 2024 appointment.    Would like to know if PCP would reconsider and provide a referral for 11/28/23 service? Relayed to caller will request back dated referral for 11/28/23, but patient had not established care with Dr. Thompson until December 2023 so may not be possible.    Requesting referral is faxed to Attn: Kate at

## 2024-03-25 DIAGNOSIS — G47.00 INSOMNIA, UNSPECIFIED TYPE: ICD-10-CM

## 2024-03-25 RX ORDER — TRAZODONE HYDROCHLORIDE 150 MG/1
300-450 TABLET ORAL AT BEDTIME
Qty: 90 TABLET | Refills: 0 | Status: SHIPPED | OUTPATIENT
Start: 2024-03-25 | End: 2024-05-14

## 2024-03-25 NOTE — TELEPHONE ENCOUNTER
Patient calling to get a medication refill on medication(s) attached    Patient is currently has a couple left

## 2024-04-08 ENCOUNTER — OFFICE VISIT (OUTPATIENT)
Dept: CARDIOLOGY | Facility: CLINIC | Age: 79
End: 2024-04-08
Attending: FAMILY MEDICINE
Payer: COMMERCIAL

## 2024-04-08 VITALS
HEART RATE: 96 BPM | RESPIRATION RATE: 16 BRPM | SYSTOLIC BLOOD PRESSURE: 134 MMHG | BODY MASS INDEX: 28.54 KG/M2 | WEIGHT: 171.3 LBS | HEIGHT: 65 IN | DIASTOLIC BLOOD PRESSURE: 82 MMHG

## 2024-04-08 DIAGNOSIS — I10 ESSENTIAL HYPERTENSION, BENIGN: ICD-10-CM

## 2024-04-08 DIAGNOSIS — I26.99 ACUTE PULMONARY EMBOLISM, UNSPECIFIED PULMONARY EMBOLISM TYPE, UNSPECIFIED WHETHER ACUTE COR PULMONALE PRESENT (H): ICD-10-CM

## 2024-04-08 DIAGNOSIS — I48.0 PAROXYSMAL ATRIAL FIBRILLATION (H): Primary | ICD-10-CM

## 2024-04-08 PROCEDURE — 93000 ELECTROCARDIOGRAM COMPLETE: CPT | Performed by: INTERNAL MEDICINE

## 2024-04-08 PROCEDURE — 99204 OFFICE O/P NEW MOD 45 MIN: CPT | Performed by: INTERNAL MEDICINE

## 2024-04-08 RX ORDER — APIXABAN 5 MG/1
5 TABLET, FILM COATED ORAL
COMMUNITY
Start: 2024-02-09 | End: 2024-08-19

## 2024-04-08 NOTE — PROGRESS NOTES
[facility] Office Consult     Assessment:   (I48.0) Paroxysmal atrial fibrillation (H)  (primary encounter diagnosis)  Comment: Truly paroxysmal, most likely not valvular, and asymptomatic.  This is based on prior echo.  Will obtain her own echocardiogram.  Will continue Eliquis.  Her CHADS2 Vascor is at least 4.  Should she have recurrence would consider antiarrhythmic.  Continue metoprolol.  If she has bleeding might need to consider left atrial appendage occlusion.    (I10) Essential hypertension, benign  Comment: Under good control currently on metoprolol.    (I26.99) Acute pulmonary embolism, unspecified pulmonary embolism type, unspecified whether acute cor pulmonale present (H)  Comment: So noted, felt to be due to right hip fracture and DVT that developed thereafter.     Plan:   1.  Continue current meds.  2.  Echocardiogram.  3.  Follow-up me in 6 months or sooner if needed.  4.  Suggest she speak with her primary doctor about duration of blood thinner for the pulmonary embolus.  I would only think 3 to 6 months since it was provoked.  5.  An occasional Excedrin or nonsteroidal for severe pain but would use 500 mg of acetaminophen in the interim.    History of Present Illness:   Thank you for asking the [facility] Care team to see Bharti Peña a 79 year old female  in consultation  to evaluate atrial fibrillation.   Patient was living independently alone in the [place].  She was orthostatic after getting up and fell to the ground fracturing her right hip and subsequently having surgery.  Postop, she had a DVT with subsequent pulmonary emboli and some atrial fibrillation.  She could not feel the atrial fibrillation and denies any syncope, presyncope, chest pains, palpitations, significant shortness of breath, PND, with apnea or peripheral edema.    Past Medical History:     Past Medical History:   Diagnosis Date    Paroxysmal atrial fibrillation July 2023     Hypertension     Thyroid disease/hypothyroid acquired      Past history is negative for cancer, tuberculosis, diabetes mellitus, myocardial infarction,  rheumatic fever, cerebrovascular accident, chronic kidney disease, peptic ulcer disease, chronic obstructive pulmonary disease, and no lipid disorder.     Past Surgical History:     Past Surgical History:   Procedure Laterality Date    COLONOSCOPY      ORTHOPEDIC SURGERY       Family History:   Family history negative for coronary artery disease.  Family History   Problem Relation Age of Onset    Hypertension Brother      Social History:   She is , lives alone independently in senior housing, used to smoke less than a pack a day for 10 years quitting this 50 years ago, reports that she has never smoked. She has never used smokeless tobacco. She reports that she does not currently use alcohol. She reports that she does not currently use drugs. The primary care physician is Michelle Thompson    Meds:   Scheduled Meds:  Current Outpatient Medications   Medication Sig Dispense Refill    acetaminophen (TYLENOL) 500 MG tablet Take 500-1,000 mg by mouth every 6 hours as needed for mild pain      brimonidine (ALPHAGAN) 0.2 % ophthalmic solution Place 1 drop into both eyes 3 times daily      calcium carbonate (OS-TAMIKO) 1500 (600 Ca) MG tablet Take 600 mg by mouth 2 times daily (with meals)      cholecalciferol (VITAMIN D3) 25 mcg (1000 units) capsule Take 1 capsule by mouth daily Evening      coenzyme Q-10 capsule Take 1 capsule by mouth daily  120 mg      ELIQUIS ANTICOAGULANT 5 MG tablet Take 5 mg by mouth 2 times daily      fenofibrate (TRIGLIDE/LOFIBRA) 160 MG tablet Take 160 mg by mouth daily      fish oil-omega-3 fatty acids 500 MG capsule Take 1 capsule by mouth 2 times daily      latanoprost (XALATAN) 0.005 % ophthalmic solution Place 1 drop into both eyes daily      levothyroxine (SYNTHROID/LEVOTHROID) 50 MCG tablet Take 50 mcg by mouth daily       "metoprolol succinate ER (TOPROL XL) 50 MG 24 hr tablet Take 1 tablet (50 mg) by mouth daily 90 tablet 1    multivitamin w/minerals (THERA-VIT-M) tablet Take 1 tablet by mouth daily      netarsudil (RHOPRESSA) 0.02 % ophthalmic solution Place 1 drop into both eyes at bedtime      raloxifene (EVISTA) 60 MG tablet Take 1 tablet (60 mg) by mouth daily 90 tablet 1    red yeast rice 600 MG CAPS Take 600 mg by mouth daily      traZODone (DESYREL) 150 MG tablet Take 2-3 tablets (300-450 mg) by mouth at bedtime 90 tablet 0    vitamin D3 (CHOLECALCIFEROL) 50 mcg (2000 units) tablet Take 1 tablet by mouth 2 times daily      Vitamin E 90 MG (200 UNIT) capsule Take 200 Units by mouth daily         PRN Meds:.  No current facility-administered medications for this visit.       Allergies:   Iodinated contrast media, Iodine, and Latex    Objective:      Physical Exam  77.7 kg (171 lb 4.8 oz)  1.638 m (5' 4.5\")  Body mass index is 28.95 kg/m .  /82 (BP Location: Left arm, Patient Position: Sitting, Cuff Size: Adult Regular)   Pulse 96   Resp 16   Ht 1.638 m (5' 4.5\")   Wt 77.7 kg (171 lb 4.8 oz)   LMP  (LMP Unknown)   BMI 28.95 kg/m      General Appearance:   Alert, cooperative and in no acute distress.   HEENT:  No scleral icterus; the mucous membranes were pink and moist.   Neck: JVP normal. No thyromegaly. No HJR   Chest: The spine was straight. The chest was symmetric.   Lungs:   Respirations unlabored; the lungs are clear to auscultation.   Cardiovascular:   S1 and S2 without murmur, clicks or rubs. Brachial, radial, carotid and posterior tibial pulses are intact and symetrical.  No carotid bruits noted   Abdomen:  No organomegaly, masses, bruits, or tenderness. Bowels sounds are present   Extremities: No cyanosis, clubbing, or edema.   Skin: No xanthelasma.   Neurologic: Mood and affect are appropriate.         Lab Reviewed Personally by myself  Lab Results   Component Value Date     12/22/2023    CO2 28 " "12/22/2023    BUN 23.9 12/22/2023     Lab Results   Component Value Date    WBC 8.6 12/22/2023    HGB 14.8 12/22/2023    HCT 42.1 12/22/2023    MCV 98 12/22/2023     12/22/2023     Lab Results   Component Value Date    CHOL 149 12/22/2023    TRIG 239 12/22/2023    HDL 32 12/22/2023     No results found for: \"BNP\"    ECG personally reviewed by myself shows sinus rhythm, first-degree AV block, occasional PVC, possible old septal Q wave MI type.     Review of Systems:     Review of Systems:   Enc Vitals  BP: 134/82  Pulse: 96  Resp: 16  Weight: 77.7 kg (171 lb 4.8 oz)  Height: 163.8 cm (5' 4.5\")                                          "

## 2024-04-08 NOTE — LETTER
4/8/2024    CATARINA CRUZ [place], DO  [address] [facility] [address]    RE: Bharti Mijaresaleksandra       Dear Colleague,     I had the pleasure of seeing Bharti Peña in the [facility].    [facility] Care Office Consult     Assessment:   (I48.0) Paroxysmal atrial fibrillation (H)  (primary encounter diagnosis)  Comment: Truly paroxysmal, most likely not valvular, and asymptomatic.  This is based on prior echo.  Will obtain her own echocardiogram.  Will continue Eliquis.  Her CHADS2 Vascor is at least 4.  Should she have recurrence would consider antiarrhythmic.  Continue metoprolol.  If she has bleeding might need to consider left atrial appendage occlusion.    (I10) Essential hypertension, benign  Comment: Under good control currently on metoprolol.    (I26.99) Acute pulmonary embolism, unspecified pulmonary embolism type, unspecified whether acute cor pulmonale present (H)  Comment: So noted, felt to be due to right hip fracture and DVT that developed thereafter.     Plan:   1.  Continue current meds.  2.  Echocardiogram.  3.  Follow-up me in 6 months or sooner if needed.  4.  Suggest she speak with her primary doctor about duration of blood thinner for the pulmonary embolus.  I would only think 3 to 6 months since it was provoked.  5.  An occasional Excedrin or nonsteroidal for severe pain but would use 500 mg of acetaminophen in the interim.    History of Present Illness:   Thank you for asking the [facility] Care team to see Bharti Peña a 79 year old female  in consultation  to evaluate atrial fibrillation.   Patient was living independently alone in the [facility] in [place].  She was orthostatic after getting up and fell to the ground fracturing her right hip and subsequently having surgery.  Postop, she had a DVT with subsequent pulmonary emboli and some atrial fibrillation.  She could not feel the atrial fibrillation and denies any syncope, presyncope,  chest pains, palpitations, significant shortness of breath, PND, with apnea or peripheral edema.    Past Medical History:     Past Medical History:   Diagnosis Date    Paroxysmal atrial fibrillation July 2023    Hypertension     Thyroid disease/hypothyroid acquired      Past history is negative for cancer, tuberculosis, diabetes mellitus, myocardial infarction,  rheumatic fever, cerebrovascular accident, chronic kidney disease, peptic ulcer disease, chronic obstructive pulmonary disease, and no lipid disorder.     Past Surgical History:     Past Surgical History:   Procedure Laterality Date    COLONOSCOPY      ORTHOPEDIC SURGERY       Family History:   Family history negative for coronary artery disease.  Family History   Problem Relation Age of Onset    Hypertension Brother      Social History:   She is , lives alone independently in senior housing, used to smoke less than a pack a day for 10 years quitting this 50 years ago, reports that she has never smoked. She has never used smokeless tobacco. She reports that she does not currently use alcohol. She reports that she does not currently use drugs. The primary care physician is Michelle Thompson    Meds:   Scheduled Meds:  Current Outpatient Medications   Medication Sig Dispense Refill    acetaminophen (TYLENOL) 500 MG tablet Take 500-1,000 mg by mouth every 6 hours as needed for mild pain      brimonidine (ALPHAGAN) 0.2 % ophthalmic solution Place 1 drop into both eyes 3 times daily      calcium carbonate (OS-TAMIKO) 1500 (600 Ca) MG tablet Take 600 mg by mouth 2 times daily (with meals)      cholecalciferol (VITAMIN D3) 25 mcg (1000 units) capsule Take 1 capsule by mouth daily Evening      coenzyme Q-10 capsule Take 1 capsule by mouth daily  120 mg      ELIQUIS ANTICOAGULANT 5 MG tablet Take 5 mg by mouth 2 times daily      fenofibrate (TRIGLIDE/LOFIBRA) 160 MG tablet Take 160 mg by mouth daily      fish oil-omega-3 fatty acids 500 MG capsule Take 1  "capsule by mouth 2 times daily      latanoprost (XALATAN) 0.005 % ophthalmic solution Place 1 drop into both eyes daily      levothyroxine (SYNTHROID/LEVOTHROID) 50 MCG tablet Take 50 mcg by mouth daily      metoprolol succinate ER (TOPROL XL) 50 MG 24 hr tablet Take 1 tablet (50 mg) by mouth daily 90 tablet 1    multivitamin w/minerals (THERA-VIT-M) tablet Take 1 tablet by mouth daily      netarsudil (RHOPRESSA) 0.02 % ophthalmic solution Place 1 drop into both eyes at bedtime      raloxifene (EVISTA) 60 MG tablet Take 1 tablet (60 mg) by mouth daily 90 tablet 1    red yeast rice 600 MG CAPS Take 600 mg by mouth daily      traZODone (DESYREL) 150 MG tablet Take 2-3 tablets (300-450 mg) by mouth at bedtime 90 tablet 0    vitamin D3 (CHOLECALCIFEROL) 50 mcg (2000 units) tablet Take 1 tablet by mouth 2 times daily      Vitamin E 90 MG (200 UNIT) capsule Take 200 Units by mouth daily         PRN Meds:.  No current facility-administered medications for this visit.       Allergies:   Iodinated contrast media, Iodine, and Latex    Objective:      Physical Exam  77.7 kg (171 lb 4.8 oz)  1.638 m (5' 4.5\")  Body mass index is 28.95 kg/m .  /82 (BP Location: Left arm, Patient Position: Sitting, Cuff Size: Adult Regular)   Pulse 96   Resp 16   Ht 1.638 m (5' 4.5\")   Wt 77.7 kg (171 lb 4.8 oz)   LMP  (LMP Unknown)   BMI 28.95 kg/m      General Appearance:   Alert, cooperative and in no acute distress.   HEENT:  No scleral icterus; the mucous membranes were pink and moist.   Neck: JVP normal. No thyromegaly. No HJR   Chest: The spine was straight. The chest was symmetric.   Lungs:   Respirations unlabored; the lungs are clear to auscultation.   Cardiovascular:   S1 and S2 without murmur, clicks or rubs. Brachial, radial, carotid and posterior tibial pulses are intact and symetrical.  No carotid bruits noted   Abdomen:  No organomegaly, masses, bruits, or tenderness. Bowels sounds are present   Extremities: No " "cyanosis, clubbing, or edema.   Skin: No xanthelasma.   Neurologic: Mood and affect are appropriate.         Lab Reviewed Personally by myself  Lab Results   Component Value Date     12/22/2023    CO2 28 12/22/2023    BUN 23.9 12/22/2023     Lab Results   Component Value Date    WBC 8.6 12/22/2023    HGB 14.8 12/22/2023    HCT 42.1 12/22/2023    MCV 98 12/22/2023     12/22/2023     Lab Results   Component Value Date    CHOL 149 12/22/2023    TRIG 239 12/22/2023    HDL 32 12/22/2023     No results found for: \"BNP\"    ECG personally reviewed by myself shows sinus rhythm, first-degree AV block, occasional PVC, possible old septal Q wave MI type.     Review of Systems:     Review of Systems:   Enc Vitals  BP: 134/82  Pulse: 96  Resp: 16  Weight: 77.7 kg (171 lb 4.8 oz)  Height: 163.8 cm (5' 4.5\")                       Thank you for allowing me to participate in the care of your patient.      Sincerely,     EDDA DAVIS MD     [facility]  cc:   Michelle Thompson, DO  [address] [place] [address]  "

## 2024-04-08 NOTE — PATIENT INSTRUCTIONS
Ms. Bharti Peña,  It certainly was nice to meet you and your daughter today.  Per our conversation you had some skipped beats in the chest.  This could represent an abnormal heartbeat atrial fibrillation and the reason I am checking the ultrasound of the heart . In the future if we want to talk about stopping blood thinner for the heart we would get a heart monitor.  We will call you the results of these tests.  In the interim continue the ELIQUIS but speak with regular doc if you need this for lung clot.  I will plan on seeing you in 6 months or sooner if need be.  Guillermo Quinonez

## 2024-04-09 ENCOUNTER — OFFICE VISIT (OUTPATIENT)
Dept: UROLOGY | Facility: CLINIC | Age: 79
End: 2024-04-09
Payer: COMMERCIAL

## 2024-04-09 VITALS
OXYGEN SATURATION: 95 % | HEART RATE: 86 BPM | SYSTOLIC BLOOD PRESSURE: 153 MMHG | BODY MASS INDEX: 29.19 KG/M2 | WEIGHT: 171 LBS | DIASTOLIC BLOOD PRESSURE: 93 MMHG | HEIGHT: 64 IN

## 2024-04-09 DIAGNOSIS — R31.21 ASYMPTOMATIC MICROSCOPIC HEMATURIA: Primary | ICD-10-CM

## 2024-04-09 LAB
ALBUMIN UR-MCNC: ABNORMAL MG/DL
APPEARANCE UR: CLEAR
ATRIAL RATE - MUSE: 83 BPM
BILIRUB UR QL STRIP: NEGATIVE
COLOR UR AUTO: YELLOW
DIASTOLIC BLOOD PRESSURE - MUSE: NORMAL MMHG
GLUCOSE UR STRIP-MCNC: NEGATIVE MG/DL
HGB UR QL STRIP: NEGATIVE
INTERPRETATION ECG - MUSE: NORMAL
KETONES UR STRIP-MCNC: NEGATIVE MG/DL
LEUKOCYTE ESTERASE UR QL STRIP: ABNORMAL
NITRATE UR QL: NEGATIVE
P AXIS - MUSE: 76 DEGREES
PH UR STRIP: 7 [PH] (ref 5–7)
PR INTERVAL - MUSE: 214 MS
QRS DURATION - MUSE: 90 MS
QT - MUSE: 358 MS
QTC - MUSE: 420 MS
R AXIS - MUSE: -8 DEGREES
SP GR UR STRIP: 1.01 (ref 1–1.03)
SYSTOLIC BLOOD PRESSURE - MUSE: NORMAL MMHG
T AXIS - MUSE: 16 DEGREES
UROBILINOGEN UR STRIP-ACNC: 0.2 E.U./DL
VENTRICULAR RATE- MUSE: 83 BPM

## 2024-04-09 PROCEDURE — 88112 CYTOPATH CELL ENHANCE TECH: CPT | Performed by: PATHOLOGY

## 2024-04-09 PROCEDURE — 81003 URINALYSIS AUTO W/O SCOPE: CPT | Mod: QW | Performed by: PHYSICIAN ASSISTANT

## 2024-04-09 PROCEDURE — 99204 OFFICE O/P NEW MOD 45 MIN: CPT | Performed by: PHYSICIAN ASSISTANT

## 2024-04-09 RX ORDER — METHYLPREDNISOLONE 32 MG/1
32 TABLET ORAL DAILY
Qty: 1 TABLET | Refills: 0 | Status: SHIPPED | OUTPATIENT
Start: 2024-04-09 | End: 2024-04-23

## 2024-04-09 ASSESSMENT — PAIN SCALES - GENERAL: PAINLEVEL: MILD PAIN (3)

## 2024-04-09 NOTE — PATIENT INSTRUCTIONS
- Urine cytology to look for abnormal cells. Please make an appointment at your local [place] lab to leave a urine sample ([phone]).  - CT scan of the kidneys. You can call [phone] to schedule this.  - My office will call you to arrange a cystoscopy with the  urologist to evaluate the interior of the bladder. Follow up as recommended by the urologist.    CYSTOSCOPY    What is a Cystoscopy?  This is a procedure done to check for problems inside the bladder.  Problems may include polyps (growths), tumors, inflammation (swelling and redness) and other concerns.    The Urologist inserts a thin tube (called a cystoscope) into the bladder.  The tube is about the size of a pencil.  We will give you numbing medicine to reduce the pain or discomfort you may feel.    The Urologist will be able to see inside the bladder by filling the bladder with water.  The water makes it easier to see any problems that may be present. You will have a sense to need to urinate and this is normal.       How should I get ready for the exam?  Nothing to do to prepare. You may eat normally the day of the exam. There is no sedation, so you may drive yourself to and from if you can drive.       Please tell your doctor if:  You have a history of urinary tract infections.  You know that you have a tumor in your bladder.  You have bleeding problems.  You have any allergies.  You are or may be pregnant.      What happens after the exam?  You may go back to your normal diet and activity as you feel ready.    For the next two days after the exam, you may notice:  Some blood in your urine.  Some burning when you urinate (use the toilet).  An urge to urinate more often.  Bladder spasms.    These are normal after the procedure. They should go away on their own after a day or two.      You can help to relieve the above listed symptoms by:  Drinking 6 to 8 large glasses of water each day (includes drinks at meals).  This will help clear the  urine.  Take warm baths to relieve pain and bladder spasms.  Do not add anything to the bath water.  You may take Tylenol (acetaminophen) per label instructions for discomfort.

## 2024-04-09 NOTE — LETTER
4/9/2024       RE: Bharti Peña  [address]  [facility] [address]     Dear Colleague,    Thank you for referring your patient, Bharti Peña, to the [facility] at [facility]. Please see a copy of my visit note below.    CC: Hematuria.    HPI: It is a pleasure to see Ms. Bharti Peña, a pleasant 79 year old female, asked to be seen in consultation by Dr. Thompson for evaluation of micro hematuria ( RBCs/HPF). On Eliquis for hx of DVT/PE and hip fracture.    The patient denies any gross hematuria.  Ms. Peña voids without difficulty (Other than nocturia).  She currently denies any dysuria, pyuria, hesitancy, intermittency, feelings of incomplete emptying, or any recent history of urinary tract infections or stones.    Contrast allergy.    Hematuria Risk Factors:  Age >40: Yes     Smoking history: no  Occupational exposure to chemicals or dyes (ie, benzenes, aromatic amines): no  History of urologic disorder or disease: no  History of irritative voiding symptoms: no  History of urinary tract infection: no  Analgesic abuse: no  History of pelvic irradiation: no    Past Medical History:   Diagnosis Date    Heart disease July 2023    Hypertension     Thyroid disease      Past Surgical History:   Procedure Laterality Date    COLONOSCOPY      ORTHOPEDIC SURGERY       Current Outpatient Medications   Medication Sig Dispense Refill    acetaminophen (TYLENOL) 500 MG tablet Take 500-1,000 mg by mouth every 6 hours as needed for mild pain      brimonidine (ALPHAGAN) 0.2 % ophthalmic solution Place 1 drop into both eyes 3 times daily      calcium carbonate (OS-TAMIKO) 1500 (600 Ca) MG tablet Take 600 mg by mouth 2 times daily (with meals)      cholecalciferol (VITAMIN D3) 25 mcg (1000 units) capsule Take 1 capsule by mouth daily Evening      coenzyme Q-10 capsule Take 1 capsule by mouth daily  120 mg      ELIQUIS  ANTICOAGULANT 5 MG tablet Take 5 mg by mouth 2 times daily      fenofibrate (TRIGLIDE/LOFIBRA) 160 MG tablet Take 160 mg by mouth daily      fish oil-omega-3 fatty acids 500 MG capsule Take 1 capsule by mouth 2 times daily      latanoprost (XALATAN) 0.005 % ophthalmic solution Place 1 drop into both eyes daily      levothyroxine (SYNTHROID/LEVOTHROID) 50 MCG tablet Take 50 mcg by mouth daily      metoprolol succinate ER (TOPROL XL) 50 MG 24 hr tablet Take 1 tablet (50 mg) by mouth daily 90 tablet 1    multivitamin w/minerals (THERA-VIT-M) tablet Take 1 tablet by mouth daily      netarsudil (RHOPRESSA) 0.02 % ophthalmic solution Place 1 drop into both eyes at bedtime      raloxifene (EVISTA) 60 MG tablet Take 1 tablet (60 mg) by mouth daily 90 tablet 1    red yeast rice 600 MG CAPS Take 600 mg by mouth daily      traZODone (DESYREL) 150 MG tablet Take 2-3 tablets (300-450 mg) by mouth at bedtime 90 tablet 0    vitamin D3 (CHOLECALCIFEROL) 50 mcg (2000 units) tablet Take 1 tablet by mouth 2 times daily      Vitamin E 90 MG (200 UNIT) capsule Take 200 Units by mouth daily       Allergies   Allergen Reactions    Iodinated Contrast Media Hives    Iodine Hives    Latex Hives     FAMILY HISTORY: There is no reported history of genitourinary carcinoma.  There is no history of urolithiasis.      Social History     Socioeconomic History    Marital status:      Spouse name: Not on file    Number of children: Not on file    Years of education: Not on file    Highest education level: Not on file   Occupational History    Not on file   Tobacco Use    Smoking status: Never    Smokeless tobacco: Never   Vaping Use    Vaping Use: Never used   Substance and Sexual Activity    Alcohol use: Not Currently    Drug use: Not Currently    Sexual activity: Not on file   Other Topics Concern    Parent/sibling w/ CABG, MI or angioplasty before 65F 55M? No   Social History Narrative    Not on file     Social Determinants of Health      Financial Resource Strain: Low Risk  (12/21/2023)    Financial Resource Strain     Within the past 12 months, have you or your family members you live with been unable to get utilities (heat, electricity) when it was really needed?: No   Food Insecurity: Low Risk  (12/21/2023)    Food Insecurity     Within the past 12 months, did you worry that your food would run out before you got money to buy more?: No     Within the past 12 months, did the food you bought just not last and you didn t have money to get more?: No   Transportation Needs: Low Risk  (12/21/2023)    Transportation Needs     Within the past 12 months, has lack of transportation kept you from medical appointments, getting your medicines, non-medical meetings or appointments, work, or from getting things that you need?: No   Physical Activity: Not on file   Stress: Not on file   Social Connections: Not on file   Interpersonal Safety: Not on file   Housing Stability: Low Risk  (12/21/2023)    Housing Stability     Do you have housing? : Yes     Are you worried about losing your housing?: No       PHYSICAL EXAM:   There were no vitals filed for this visit.  PSYCH: NAD  EYES: EOMI  MOUTH: MMM  NEURO: AAO x3    Office Visit on 04/08/2024   Component Date Value Ref Range Status    Ventricular Rate 04/08/2024 83  BPM Final    Atrial Rate 04/08/2024 83  BPM Final    FL Interval 04/08/2024 214  ms Final    QRS Duration 04/08/2024 90  ms Final    QT 04/08/2024 358  ms Final    QTc 04/08/2024 420  ms Final    P Axis 04/08/2024 76  degrees Final    R AXIS 04/08/2024 -8  degrees Final    T Axis 04/08/2024 16  degrees Final    Interpretation ECG 04/08/2024    Final                    Value:Sinus rhythm with 1st degree A-V block with occasional Premature ventricular complexes  Otherwise normal ECG  No previous ECGs available  Confirmed by RYAN ROGERS, LES LOC:[facility] ([id number]) on 4/9/2024 12:29:19 PM           ASSESSMENT and PLAN:    79 year old female with high risk  micro hematuria.  The differential diagnosis at this point includes stone disease, infection, vaginal contaminant, urothelial malignancy, renal disorder versus another yet unknown diagnosis.    At this time, recommend proceeding with comprehensive hematuria evaluation to include:  - Urine cytology to look for cells concerning for malignancy.  - CT urogram for upper tract imaging. Premeds ordered for 12 and 2 hours prior. She will also have benadryl on hand to take in addition if needed.   - Cystoscopy with the first available urologist to evaluate the interior of the bladder. Follow up for hematuria as recommended by urologist performing cystoscopic evaluation.    Thank you for allowing me to participate in Ms. Peña's care. I will keep you updated of her progress, but please do not hesitate to contact me with any questions.    Nicki Hill PA-C  [facility]  25 minutes spent on the date of the encounter doing chart review, review of outside records, review of test results, interpretation of tests, patient visit and documentation.

## 2024-04-12 LAB
PATH REPORT.COMMENTS IMP SPEC: NORMAL
PATH REPORT.FINAL DX SPEC: NORMAL
PATH REPORT.GROSS SPEC: NORMAL
PATH REPORT.MICROSCOPIC SPEC OTHER STN: NORMAL
PATH REPORT.RELEVANT HX SPEC: NORMAL

## 2024-04-23 DIAGNOSIS — R31.21 ASYMPTOMATIC MICROSCOPIC HEMATURIA: ICD-10-CM

## 2024-04-23 RX ORDER — METHYLPREDNISOLONE 32 MG/1
32 TABLET ORAL DAILY
Qty: 1 TABLET | Refills: 0 | Status: SHIPPED | OUTPATIENT
Start: 2024-04-23 | End: 2024-09-10

## 2024-04-23 RX ORDER — METHYLPREDNISOLONE 32 MG/1
32 TABLET ORAL DAILY
Qty: 1 TABLET | Refills: 0 | Status: SHIPPED | OUTPATIENT
Start: 2024-04-23 | End: 2024-10-01

## 2024-04-30 ENCOUNTER — HOSPITAL ENCOUNTER (OUTPATIENT)
Dept: CARDIOLOGY | Facility: CLINIC | Age: 79
Discharge: HOME | End: 2024-04-30
Attending: INTERNAL MEDICINE
Payer: COMMERCIAL

## 2024-04-30 ENCOUNTER — HOSPITAL ENCOUNTER (OUTPATIENT)
Dept: CT IMAGING | Facility: CLINIC | Age: 79
Discharge: HOME | End: 2024-04-30
Attending: PHYSICIAN ASSISTANT
Payer: COMMERCIAL

## 2024-04-30 DIAGNOSIS — R31.21 ASYMPTOMATIC MICROSCOPIC HEMATURIA: ICD-10-CM

## 2024-04-30 DIAGNOSIS — I48.0 PAROXYSMAL ATRIAL FIBRILLATION (H): ICD-10-CM

## 2024-04-30 LAB
CREAT BLD-MCNC: 0.9 MG/DL (ref 0.6–1.1)
EGFRCR SERPLBLD CKD-EPI 2021: >60 ML/MIN/1.73M2
LVEF ECHO: NORMAL

## 2024-04-30 PROCEDURE — 93306 TTE W/DOPPLER COMPLETE: CPT

## 2024-04-30 PROCEDURE — 93306 TTE W/DOPPLER COMPLETE: CPT | Mod: 26 | Performed by: INTERNAL MEDICINE

## 2024-04-30 PROCEDURE — 82565 ASSAY OF CREATININE: CPT

## 2024-04-30 PROCEDURE — 74178 CT ABD&PLV WO CNTR FLWD CNTR: CPT

## 2024-04-30 PROCEDURE — 250N000011 HC RX IP 250 OP 636: Performed by: PHYSICIAN ASSISTANT

## 2024-04-30 RX ORDER — IOPAMIDOL 755 MG/ML
90 INJECTION, SOLUTION INTRAVASCULAR ONCE
Status: COMPLETED | OUTPATIENT
Start: 2024-04-30 | End: 2024-04-30

## 2024-04-30 RX ADMIN — IOPAMIDOL 90 ML: 755 INJECTION, SOLUTION INTRAVENOUS at 15:37

## 2024-05-01 LAB — RADIOLOGIST FLAGS: NORMAL

## 2024-05-13 DIAGNOSIS — Z01.812 PRE-PROCEDURE LAB EXAM: Primary | ICD-10-CM

## 2024-05-13 DIAGNOSIS — R31.29 MICROSCOPIC HEMATURIA: ICD-10-CM

## 2024-05-14 DIAGNOSIS — G47.00 INSOMNIA, UNSPECIFIED TYPE: ICD-10-CM

## 2024-05-14 DIAGNOSIS — K86.2 PANCREATIC CYST: Primary | ICD-10-CM

## 2024-05-15 RX ORDER — TRAZODONE HYDROCHLORIDE 150 MG/1
300-450 TABLET ORAL AT BEDTIME
Qty: 90 TABLET | Refills: 0 | Status: SHIPPED | OUTPATIENT
Start: 2024-05-15

## 2024-05-17 ENCOUNTER — TELEPHONE (OUTPATIENT)
Dept: GASTROENTEROLOGY | Facility: CLINIC | Age: 79
End: 2024-05-17
Payer: COMMERCIAL

## 2024-05-17 NOTE — TELEPHONE ENCOUNTER
Advanced Endoscopy     Referring provider: Nicki Hill PA-C  [facility]    Referred to: Advanced Endoscopy Provider Group     Provider Requested: NA     Referral Received: 5/14/24     Records received: EPIC     Images received: PACS    Insurance Coverage: [employer]    Evaluation for: K86.2 (ICD-10-CM) - Pancreatic cyst  Additional Information:  Cyst on Pancreas found on CT     Clinical History (per RN review):   EXAM: CT UROGRAM WO and W CONTRAST  LOCATION: [facility]  DATE: 4/30/2024     INDICATION: hematuria  IMPRESSION:  1.  Diffuse circumferential wall thickening or bladder with mild surrounding fat stranding suggestive of cystitis. Otherwise, no CT evidence for source of hematuria.  2.  Small cyst in the posterior aspect of the uncinate process of the pancreas measures 1.7 x 0.8 cm. This may represent a side branch intraductal papillary mucinous neoplasm (IPMN). Follow-up guidelines as below.  3.  Abnormal thickening of the postmenopausal endometrium. Gynecology consultation and possible tissue sampling are recommended.    MD review date: 5/17/24 Ludin ROGERS Decision for clinic consultation/Orders:       Pancreas clinic. Good one for the ATUL who will join us      Referral updates/Patient contacted: 5/20/24 Accepted referral to clinic with Dr. Noland. Noted for potential [facility] clinic.

## 2024-05-21 ENCOUNTER — OFFICE VISIT (OUTPATIENT)
Dept: SURGERY | Facility: HOSPITAL | Age: 79
End: 2024-05-21
Attending: FAMILY MEDICINE
Payer: COMMERCIAL

## 2024-05-21 ENCOUNTER — LAB (OUTPATIENT)
Dept: INFUSION THERAPY | Facility: HOSPITAL | Age: 79
End: 2024-05-21
Attending: STUDENT IN AN ORGANIZED HEALTH CARE EDUCATION/TRAINING PROGRAM
Payer: COMMERCIAL

## 2024-05-21 VITALS
WEIGHT: 173.9 LBS | DIASTOLIC BLOOD PRESSURE: 78 MMHG | RESPIRATION RATE: 16 BRPM | BODY MASS INDEX: 29.85 KG/M2 | SYSTOLIC BLOOD PRESSURE: 160 MMHG | HEART RATE: 95 BPM | OXYGEN SATURATION: 93 %

## 2024-05-21 DIAGNOSIS — Z01.812 PRE-PROCEDURE LAB EXAM: ICD-10-CM

## 2024-05-21 DIAGNOSIS — R31.21 ASYMPTOMATIC MICROSCOPIC HEMATURIA: Primary | ICD-10-CM

## 2024-05-21 DIAGNOSIS — R31.29 MICROSCOPIC HEMATURIA: ICD-10-CM

## 2024-05-21 LAB
ALBUMIN UR-MCNC: NEGATIVE MG/DL
APPEARANCE UR: CLEAR
BILIRUB UR QL STRIP: NEGATIVE
COLOR UR AUTO: YELLOW
GLUCOSE UR STRIP-MCNC: NEGATIVE MG/DL
HGB UR QL STRIP: NEGATIVE
KETONES UR STRIP-MCNC: NEGATIVE MG/DL
LEUKOCYTE ESTERASE UR QL STRIP: NEGATIVE
NITRATE UR QL: NEGATIVE
PH UR STRIP: 6.5 [PH] (ref 5–7)
SP GR UR STRIP: 1.02 (ref 1–1.03)
UROBILINOGEN UR STRIP-MCNC: <2 MG/DL

## 2024-05-21 PROCEDURE — 52000 CYSTOURETHROSCOPY: CPT | Performed by: STUDENT IN AN ORGANIZED HEALTH CARE EDUCATION/TRAINING PROGRAM

## 2024-05-21 PROCEDURE — G0463 HOSPITAL OUTPT CLINIC VISIT: HCPCS | Performed by: STUDENT IN AN ORGANIZED HEALTH CARE EDUCATION/TRAINING PROGRAM

## 2024-05-21 PROCEDURE — 81003 URINALYSIS AUTO W/O SCOPE: CPT

## 2024-05-21 PROCEDURE — 99213 OFFICE O/P EST LOW 20 MIN: CPT | Mod: 25 | Performed by: STUDENT IN AN ORGANIZED HEALTH CARE EDUCATION/TRAINING PROGRAM

## 2024-05-21 ASSESSMENT — PAIN SCALES - GENERAL: PAINLEVEL: NO PAIN (0)

## 2024-05-21 NOTE — LETTER
5/21/2024         RE: Bharti Peña  [address]  [facility] [address]        Dear Colleague,    Thank you for referring your patient, Bharti Peña, to the [facility]. Please see a copy of my visit note below.    CHIEF COMPLAINT   It was my pleasure to see hBarti Peña who is a 79 year old female for follow-up of microscopic hematuria.      HPI:  Bharti Peña is a 79 year old female being seen for evaluation and a cystoscopy.  Duration of problem: Couple months  Previous treatments: None yet      Reviewed previous notes from Nicki Hill PA-C  Bharti is here for evaluation of microscopic hematuria with cystoscopy and the review of the results of her CT urogram done recently  She denies any gross hematuria  No recent history of smoking    Exam:  BP (!) 160/78 (BP Location: Left arm, Patient Position: Sitting, Cuff Size: Adult Regular)   Pulse 95   Resp 16   Wt 78.9 kg (173 lb 14.4 oz)   LMP  (LMP Unknown)   SpO2 93%   BMI 29.85 kg/m    General: age-appropriate appearing female in NAD sitting in an exam chair  Resp: no respiratory distress  CV: heart rate regular  Abdomen: Degree of obesity is moderate. Abdomen is soft and nontender. No organomegaly.   :  Deferred to cystoscopy  Neuro: grossly non focal. Normal reflexes  Motor: excellent strength throughout  Cystoscopy notes    Pre-procedure diagnosis: Microscopic hematuria  Post procedure diagnosis: normal cystoscopy  Procedure performed: cystoscopy  Surgeon: Ab Govea MD  Anesthesia: local    Indications for procedure: Patient is a 79 year old year old female with a history of microscopic hematuria.  Here today for cysto    Description of procedure: After fully informed voluntary consent was obtained patient was brought into the procedure room, identified and placed in a supine position on the cysto table.  The vagina/intraoitus were prepped and draped in a sterile fashion with betadine.   A 15F flexible cystoscope was inserted into the urethra and the bladder and urethra examined in a systematic manner.  There were no tumor stones or diverticula.  Ureteric orifices were normal in position and number and effluxing clear urine.   Distal urethra was normal.  The patient tolerated the procedure well and there were no complications.      Assessment/Plan: Patient with a history of hematuria with negative cystoscopy.  Follow up as needed    Review of Imaging:  The following imaging exams were independently viewed and interpreted by me and discussed with patient:  CT Scan Abd/Pelvis: CT urogram Abnormal: EXAM: CT UROGRAM WO and W CONTRAST  LOCATION: [facility]  DATE: 4/30/2024     INDICATION: hematuria  COMPARISON: None.  TECHNIQUE: CT scan of the abdomen and pelvis using urogram technique with pre contrast, post contrast, and delayed images. Multiplanar reformats were obtained. Dose reduction techniques were used.   CONTRAST: 90 ML ISOVUE 370     FINDINGS:   LOWER CHEST: Patchy bibasilar atelectasis and/or scarring.     HEPATOBILIARY: Normal.     PANCREAS: Small cyst in the posterior aspect of the uncinate process measures 1.7 x 0.8 cm (series 7/95). No pancreatic ductal dilation.     SPLEEN: Small hypodensities in the spleen are too small to characterize although favored to be benign.     ADRENAL GLANDS: Normal.     RIGHT KIDNEY/URETER: Benign right renal cysts do not require follow-up. No hydronephrosis or genitourinary calculi.     LEFT KIDNEY/URETER: Slightly hyperdense or enhancing left upper pole renal cyst is compatible with a cyst with hemorrhagic or proteinaceous contents. No specific follow-up required. No suspicious mass, hydronephrosis, or genitourinary calculi. 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 Mild pelviectasis of the left lower pole collecting system.     BLADDER: Diffuse circumferential wall thickening of bladder with mild surrounding fat stranding. No  abnormal filling defects on the delayed images.     BOWEL: Diverticulosis of the colon. No acute inflammatory change. No obstruction. Mild colonic stool burden. Normal appendix.     LYMPH NODES: No lymphadenopathy.     VASCULATURE: No abdominal aortic aneurysm.     PELVIC ORGANS: There is abnormal thickening of the postmenopausal endometrium measuring 12 mm.     MUSCULOSKELETAL: Degenerative changes of the spine chronic appearing T12 and L1 compression deformities. Old, healed sacrococcygeal fracture. Degenerative changes of the left hip. Right total hip arthroplasty. Osteopenia.                                                                      IMPRESSION:  1.  Diffuse circumferential wall thickening or bladder with mild surrounding fat stranding suggestive of cystitis. Otherwise, no CT evidence for source of hematuria.  2.  Small cyst in the posterior aspect of the uncinate process of the pancreas measures 1.7 x 0.8 cm. This may represent a side branch intraductal papillary mucinous neoplasm (IPMN). Follow-up guidelines as below.  3.  Abnormal thickening of the postmenopausal endometrium. Gynecology consultation and possible tissue sampling are recommended.    Review of Labs:  The following labs were reviewed by me and discussed with the patient:  UA: Normal  Urine cytology: Normal, negative for high-grade urothelial carcinoma    Assessment & Plan    Asymptomatic microscopic hematuria  I discussed in detail about the findings of cystoscopy today which were normal  She does not need further urological evaluation for this as her complete workup has been negative  She does need to see a gynecologist which she is in the process of booking an appointment with and additionally has a follow-up planned with the gastroenterologist to follow-up on the pancreatic cyst  She expressed understanding of the plan and she will see us as needed      Ab Govea MD  [facility]  [place]      ==========================    Additional Billing and Coding Information:  Review of external notes as documented above   Review of the result(s) of each unique test - UA, urine cytology, CT urogram                12 minutes spent by me on the date of the encounter doing chart review, review of test results, interpretation of tests, patient visit, and documentation apart from time spent at cystoscopy    ==========================      Again, thank you for allowing me to participate in the care of your patient.        Sincerely,        Ab Govea MD

## 2024-05-21 NOTE — NURSING NOTE
No chief complaint on file.      Blood pressure (!) 160/78, pulse 95, resp. rate 16, weight 78.9 kg (173 lb 14.4 oz), SpO2 93%. Body mass index is 29.85 kg/m .    Patient Active Problem List   Diagnosis    Pulmonary embolism (H)    Paroxysmal atrial fibrillation (H)    Essential hypertension, benign       Allergies   Allergen Reactions    Iodinated Contrast Media Hives    Iodine Hives    Latex Hives       Current Outpatient Medications   Medication Sig Dispense Refill    acetaminophen (TYLENOL) 500 MG tablet Take 500-1,000 mg by mouth every 6 hours as needed for mild pain      brimonidine (ALPHAGAN) 0.2 % ophthalmic solution Place 1 drop into both eyes 3 times daily      calcium carbonate (OS-TAMIKO) 1500 (600 Ca) MG tablet Take 600 mg by mouth 2 times daily (with meals)      cholecalciferol (VITAMIN D3) 25 mcg (1000 units) capsule Take 1 capsule by mouth daily Evening      coenzyme Q-10 capsule Take 1 capsule by mouth daily  120 mg      ELIQUIS ANTICOAGULANT 5 MG tablet Take 5 mg by mouth 2 times daily      fenofibrate (TRIGLIDE/LOFIBRA) 160 MG tablet Take 160 mg by mouth daily      fish oil-omega-3 fatty acids 500 MG capsule Take 1 capsule by mouth 2 times daily      latanoprost (XALATAN) 0.005 % ophthalmic solution Place 1 drop into both eyes daily      levothyroxine (SYNTHROID/LEVOTHROID) 50 MCG tablet Take 50 mcg by mouth daily      methylPREDNISolone (MEDROL) 32 MG tablet Take 1 tablet (32 mg) by mouth daily 2 hours prior to the procedure with IV contrast 1 tablet 0    methylPREDNISolone (MEDROL) 32 MG tablet Take 1 tablet (32 mg) by mouth daily 12 hours prior to the procedure with IV contrast 1 tablet 0    metoprolol succinate ER (TOPROL XL) 50 MG 24 hr tablet Take 1 tablet (50 mg) by mouth daily 90 tablet 1    multivitamin w/minerals (THERA-VIT-M) tablet Take 1 tablet by mouth daily      netarsudil (RHOPRESSA) 0.02 % ophthalmic solution Place 1 drop into both eyes at bedtime      raloxifene (EVISTA) 60 MG  tablet Take 1 tablet (60 mg) by mouth daily 90 tablet 1    red yeast rice 600 MG CAPS Take 600 mg by mouth daily      traZODone (DESYREL) 150 MG tablet Take 2-3 tablets (300-450 mg) by mouth at bedtime 90 tablet 0    vitamin D3 (CHOLECALCIFEROL) 50 mcg (2000 units) tablet Take 1 tablet by mouth 2 times daily      Vitamin E 90 MG (200 UNIT) capsule Take 200 Units by mouth daily         Social History     Tobacco Use    Smoking status: Never    Smokeless tobacco: Never   Vaping Use    Vaping status: Never Used   Substance Use Topics    Alcohol use: Not Currently    Drug use: Not Currently       Invasive Procedure Safety Checklist:    Procedure: Cystoscopy    Action: Complete sections and checkboxes as appropriate.    Pre-procedure:  1. Patient ID Verified with 2 identifiers (Name and  or MRN) : Yes    2. Procedure and site verified with patient/designee (when able) : Yes    3. Accurate consent documentation in medical record : Yes    4. H&P (or appropriate assessment) documented in medical record : N/A  H&P must be up to 30 days prior to procedure an updated within 24 hours of                 Procedure as applicable.     5. Relevant diagnostic and radiology test results appropriately labeled and displayed as applicable : YES    6. Blood products, implants, devices, and/or special equipment available for the procedure as applicable : YES    7. Procedure site(s) marked with provider initials [Exclusions: none] : NO    8. Marking not required. Reason : Yes  Procedure does not require site marking    Time Out:     Time-Out performed immediately prior to starting procedure, including verbal and active participation of all team members addressing: YES    1. Correct patient identity.  2. Confirmed that the correct side and site are marked.  3. An accurate procedure to be done.  4. Agreement on the procedure to be done.  5. Correct patient position.  6. Relevant images and results are properly labeled and appropriately  displayed.  7. The need to administer antibiotics or fluids for irrigation purposes during the procedure as applicable.  8. Safety precautions based on patient history or medication use.    During Procedure: Verification of correct person, site, and procedure occurs any time the responsibility for care of the patient is transferred to another member of the care team.    The following medication was given:     MEDICATION: none  ROUTE: urethral   SITE: urethral     Sarahi Zuñiga RN  5/21/2024  8:45 AM

## 2024-05-21 NOTE — PATIENT INSTRUCTIONS
"AFTER YOUR CYSTOSCOPY  ?  ?  You have just completed a cystoscopy, or \"cysto\", which allowed your physician to learn more about your bladder (or to remove a stent placed after surgery). We suggest that you continue to avoid caffeine, fruit juice, and alcohol for the next 24 hours, however, you are encouraged to return to your normal activities.  ?  ?  A few things that are considered normal after your cystoscopy:  ?     - small amount of bleeding (or spotting) that clears within the next 24 hours  ?     - slight burning sensation with urination  ?     - sensation of needing to void (urinate) more frequently  ?     - the feeling of \"air\" in your urine  ?     - mild discomfort that is relieved with Tylenol       - bladder spasms  ?  ?  ?  Please contact our office promptly if you:  ?     - develop a fever above 101 degrees  ?     - are unable to urinate  ?     - develop bright red blood that does not stop  ?     - experience severe pain or swelling  ?  ?  ?  And of course, please contact our office with any concerns or questions [phone].  ?    "

## 2024-05-21 NOTE — PROGRESS NOTES
CHIEF COMPLAINT   It was my pleasure to see Bharti Peña who is a 79 year old female for follow-up of microscopic hematuria.      HPI:  Bharti Peña is a 79 year old female being seen for evaluation and a cystoscopy.  Duration of problem: Couple months  Previous treatments: None yet      Reviewed previous notes from Nicki Hill PA-C  Bharti is here for evaluation of microscopic hematuria with cystoscopy and the review of the results of her CT urogram done recently  She denies any gross hematuria  No recent history of smoking    Exam:  BP (!) 160/78 (BP Location: Left arm, Patient Position: Sitting, Cuff Size: Adult Regular)   Pulse 95   Resp 16   Wt 78.9 kg (173 lb 14.4 oz)   LMP  (LMP Unknown)   SpO2 93%   BMI 29.85 kg/m    General: age-appropriate appearing female in NAD sitting in an exam chair  Resp: no respiratory distress  CV: heart rate regular  Abdomen: Degree of obesity is moderate. Abdomen is soft and nontender. No organomegaly.   :  Deferred to cystoscopy  Neuro: grossly non focal. Normal reflexes  Motor: excellent strength throughout  Cystoscopy notes    Pre-procedure diagnosis: Microscopic hematuria  Post procedure diagnosis: normal cystoscopy  Procedure performed: cystoscopy  Surgeon: Ab Govea MD  Anesthesia: local    Indications for procedure: Patient is a 79 year old year old female with a history of microscopic hematuria.  Here today for cysto    Description of procedure: After fully informed voluntary consent was obtained patient was brought into the procedure room, identified and placed in a supine position on the cysto table.  The vagina/intraoitus were prepped and draped in a sterile fashion with betadine.  A 15F flexible cystoscope was inserted into the urethra and the bladder and urethra examined in a systematic manner.  There were no tumor stones or diverticula.  Ureteric orifices were normal in position and number and effluxing clear urine.   Distal urethra was  normal.  The patient tolerated the procedure well and there were no complications.      Assessment/Plan: Patient with a history of hematuria with negative cystoscopy.  Follow up as needed    Review of Imaging:  The following imaging exams were independently viewed and interpreted by me and discussed with patient:  CT Scan Abd/Pelvis: CT urogram Abnormal: EXAM: CT UROGRAM WO and W CONTRAST  LOCATION: [facility]  DATE: 4/30/2024     INDICATION: hematuria  COMPARISON: None.  TECHNIQUE: CT scan of the abdomen and pelvis using urogram technique with pre contrast, post contrast, and delayed images. Multiplanar reformats were obtained. Dose reduction techniques were used.   CONTRAST: 90 ML ISOVUE 370     FINDINGS:   LOWER CHEST: Patchy bibasilar atelectasis and/or scarring.     HEPATOBILIARY: Normal.     PANCREAS: Small cyst in the posterior aspect of the uncinate process measures 1.7 x 0.8 cm (series 7/95). No pancreatic ductal dilation.     SPLEEN: Small hypodensities in the spleen are too small to characterize although favored to be benign.     ADRENAL GLANDS: Normal.     RIGHT KIDNEY/URETER: Benign right renal cysts do not require follow-up. No hydronephrosis or genitourinary calculi.     LEFT KIDNEY/URETER: Slightly hyperdense or enhancing left upper pole renal cyst is compatible with a cyst with hemorrhagic or proteinaceous contents. No specific follow-up required. No suspicious mass, hydronephrosis, or genitourinary calculi. 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 Mild pelviectasis of the left lower pole collecting system.     BLADDER: Diffuse circumferential wall thickening of bladder with mild surrounding fat stranding. No abnormal filling defects on the delayed images.     BOWEL: Diverticulosis of the colon. No acute inflammatory change. No obstruction. Mild colonic stool burden. Normal appendix.     LYMPH NODES: No lymphadenopathy.     VASCULATURE: No abdominal aortic  aneurysm.     PELVIC ORGANS: There is abnormal thickening of the postmenopausal endometrium measuring 12 mm.     MUSCULOSKELETAL: Degenerative changes of the spine chronic appearing T12 and L1 compression deformities. Old, healed sacrococcygeal fracture. Degenerative changes of the left hip. Right total hip arthroplasty. Osteopenia.                                                                      IMPRESSION:  1.  Diffuse circumferential wall thickening or bladder with mild surrounding fat stranding suggestive of cystitis. Otherwise, no CT evidence for source of hematuria.  2.  Small cyst in the posterior aspect of the uncinate process of the pancreas measures 1.7 x 0.8 cm. This may represent a side branch intraductal papillary mucinous neoplasm (IPMN). Follow-up guidelines as below.  3.  Abnormal thickening of the postmenopausal endometrium. Gynecology consultation and possible tissue sampling are recommended.    Review of Labs:  The following labs were reviewed by me and discussed with the patient:  UA: Normal  Urine cytology: Normal, negative for high-grade urothelial carcinoma    Assessment & Plan     Asymptomatic microscopic hematuria  I discussed in detail about the findings of cystoscopy today which were normal  She does not need further urological evaluation for this as her complete workup has been negative  She does need to see a gynecologist which she is in the process of booking an appointment with and additionally has a follow-up planned with the gastroenterologist to follow-up on the pancreatic cyst  She expressed understanding of the plan and she will see us as needed      Ab Govea MD  [facility]      ==========================    Additional Billing and Coding Information:  Review of external notes as documented above   Review of the result(s) of each unique test - UA, urine cytology, CT urogram                12 minutes spent by me on the date of the encounter  doing chart review, review of test results, interpretation of tests, patient visit, and documentation apart from time spent at cystoscopy    ==========================

## 2024-06-06 ENCOUNTER — TELEPHONE (OUTPATIENT)
Dept: FAMILY MEDICINE | Facility: CLINIC | Age: 79
End: 2024-06-06
Payer: COMMERCIAL

## 2024-06-06 DIAGNOSIS — H52.4 PRESBYOPIA: Primary | ICD-10-CM

## 2024-06-06 NOTE — TELEPHONE ENCOUNTER
FYI - Status Update    Who is Calling: [facility]       Update: Wanting a back dated referral for the date 11/28/2023    Does caller want a call/response back: Yes call [facility] back

## 2024-06-07 NOTE — TELEPHONE ENCOUNTER
Left detailed message for Kate that this has been placed. Did let her know to call back if this needs to be faxed and provide a good fax number.

## 2024-06-07 NOTE — TELEPHONE ENCOUNTER
Returning Call    Reason for call:  Back Dated Referral Request     Information:  Kate with [facility] returning call.  Need back dated referral to 11/28/2023 and referral faxed to  to the Attn: Kate    Caller has additional questions:  No

## 2024-06-17 ENCOUNTER — TELEPHONE (OUTPATIENT)
Dept: FAMILY MEDICINE | Facility: CLINIC | Age: 79
End: 2024-06-17
Payer: COMMERCIAL

## 2024-06-17 DIAGNOSIS — G47.00 INSOMNIA, UNSPECIFIED TYPE: ICD-10-CM

## 2024-06-17 RX ORDER — TRAZODONE HYDROCHLORIDE 150 MG/1
300-450 TABLET ORAL AT BEDTIME
Qty: 90 TABLET | Refills: 0 | OUTPATIENT
Start: 2024-06-17

## 2024-06-20 NOTE — TELEPHONE ENCOUNTER
Medication Question or Refill    Contacts       Contact Date/Time Type Contact Phone/Fax    06/17/2024 12:49 PM CDT Fax (Incoming) [id number]  [place], [place] - [address] AT [place] [address] (Pharmacy) [phone]    06/20/2024 11:27 AM CDT Phone (Incoming) Bharti Peña (Self) [phone] (H)            What medication are you calling about (include dose and sig)?:   traZODone (DESYREL) 150 MG tablet 90 tablet 0 5/15/2024 -- No   Sig - Route: Take 2-3 tablets (300-450 mg) by mouth at bedtime - Oral       Preferred Pharmacy:   [id number] - [place], [place] - [address] AT [place] [address] [address]  [facility] [address]  Phone: [phone] Fax: [phone]    Controlled Substance Agreement on file:   CSA -- Patient Level:    CSA: None found at the patient level.       Who prescribed the medication?: Michelle Thompson T, DO     Do you need a refill? Yes    When did you use the medication last? Last night, 6/19/24    Patient offered an appointment? No    Do you have any questions or concerns?  Yes, patient request call back one medication is filled      Could we send this information to you in [facility] or would you prefer to receive a phone call?:   Patient would prefer a phone call   Okay to leave a detailed message?: Yes at Home number on file [phone] (home)

## 2024-06-21 DIAGNOSIS — G47.00 INSOMNIA, UNSPECIFIED TYPE: ICD-10-CM

## 2024-06-21 DIAGNOSIS — M81.0 AGE-RELATED OSTEOPOROSIS WITHOUT CURRENT PATHOLOGICAL FRACTURE: ICD-10-CM

## 2024-06-21 RX ORDER — RALOXIFENE HYDROCHLORIDE 60 MG/1
60 TABLET, FILM COATED ORAL DAILY
Qty: 90 TABLET | Refills: 0 | Status: SHIPPED | OUTPATIENT
Start: 2024-06-21 | End: 2024-09-25

## 2024-06-24 RX ORDER — TRAZODONE HYDROCHLORIDE 150 MG/1
300-450 TABLET ORAL AT BEDTIME
Qty: 90 TABLET | Refills: 1 | Status: SHIPPED | OUTPATIENT
Start: 2024-06-24 | End: 2024-09-27

## 2024-06-24 RX ORDER — TRAZODONE HYDROCHLORIDE 150 MG/1
300-450 TABLET ORAL AT BEDTIME
Qty: 90 TABLET | Refills: 0 | OUTPATIENT
Start: 2024-06-24

## 2024-06-24 NOTE — TELEPHONE ENCOUNTER
Called pt. Pt reports she has been taking this and needs a refill.     Pt states it has been helping her some.     Pt thanked for call.

## 2024-06-26 ENCOUNTER — MYC MEDICAL ADVICE (OUTPATIENT)
Dept: INTERNAL MEDICINE | Facility: CLINIC | Age: 79
End: 2024-06-26
Payer: COMMERCIAL

## 2024-06-28 VITALS — DIASTOLIC BLOOD PRESSURE: 80 MMHG | SYSTOLIC BLOOD PRESSURE: 134 MMHG

## 2024-06-28 NOTE — TELEPHONE ENCOUNTER
MYC RESPONSE:   Just took my blood pressure. It was 134/80.       Updated chart.       Juancarlos Nuno Jr., CMA on 6/28/2024 at 1:59 PM

## 2024-07-08 ENCOUNTER — TELEPHONE (OUTPATIENT)
Dept: FAMILY MEDICINE | Facility: CLINIC | Age: 79
End: 2024-07-08
Payer: COMMERCIAL

## 2024-07-08 ENCOUNTER — PATIENT OUTREACH (OUTPATIENT)
Dept: CARE COORDINATION | Facility: CLINIC | Age: 79
End: 2024-07-08
Payer: COMMERCIAL

## 2024-07-08 DIAGNOSIS — R93.89 THICKENED ENDOMETRIUM: Primary | ICD-10-CM

## 2024-07-08 NOTE — TELEPHONE ENCOUNTER
Order/Referral Request    Who is requesting: patient     Orders being requested: gynecology     Reason service is needed/diagnosis: patient was told that she should be seen in gynecology because of something being thickened      When are orders needed by: at earliest convenience     Has this been discussed with Provider: Yes    Does patient have a preference on a Group/Provider/Facility? NA     Does patient have an appointment scheduled?: No    Where to send orders: Place orders within Epic    Could we send this information to you in [place] or would you prefer to receive a phone call?:   Patient would prefer a phone call   Okay to leave a detailed message?: Yes at Cell number on file:    Telephone Information:   Mobile [phone]

## 2024-07-09 NOTE — TELEPHONE ENCOUNTER
Spoke with patient and relayed that referral has been placed.    Someone should be reaching out within 2 business days.If someone does not, patient is to call [phone] to schedule    She thanked for the call.

## 2024-07-09 NOTE — TELEPHONE ENCOUNTER
Per surgeon note on 5/21/2024:  3. Abnormal thickening of the postmenopausal endometrium. Gynecology consultation and possible tissue sampling are recommended.     Referral pended for PCP review & approval

## 2024-08-19 DIAGNOSIS — E78.5 HYPERLIPIDEMIA LDL GOAL <70: ICD-10-CM

## 2024-08-19 DIAGNOSIS — E03.9 HYPOTHYROIDISM, UNSPECIFIED TYPE: ICD-10-CM

## 2024-08-19 DIAGNOSIS — I48.0 PAROXYSMAL ATRIAL FIBRILLATION (H): Primary | ICD-10-CM

## 2024-08-19 NOTE — TELEPHONE ENCOUNTER
Called and spoke with patient regarding below message from refill RN. Patient states that these prescriptions were last filled by her provider when she lived in [place]. Confirmed medications needed as well as correct dose and frequency with patient.     Pauline Glover, RN   to [facility]       8/19/24  3:16 PM  Clinic RN: Please contact patient because the medication is listed as historical. Confirm patient is taking this medication. Document findings and route refill encounter to provider for approval or denial.

## 2024-08-19 NOTE — TELEPHONE ENCOUNTER
Patient calling to get a medication refill on medication(s) attached      ELIQUIS ANTICOAGULANT 5 MG tablet -- -- 2/9/2024 -- Yes  Sig - Route: Take 5 mg by mouth 2 times daily - Oral  Class: Historical  ------------------------------------------------------------   Disp Refills Start End TJ  fenofibrate (TRIGLIDE/LOFIBRA) 160 MG tablet -- --  -- No  Sig - Route: Take 160 mg by mouth daily - Oral  Class: Historical  -------------------------------------------------------------   Disp Refills Start End TJ  levothyroxine (SYNTHROID/LEVOTHROID) 50 MCG tablet -- --  -- No  Sig - Route: Take 50 mcg by mouth daily - Oral  Class: Historical

## 2024-08-20 RX ORDER — FENOFIBRATE 160 MG/1
160 TABLET ORAL DAILY
Qty: 90 TABLET | Refills: 3 | Status: SHIPPED | OUTPATIENT
Start: 2024-08-20

## 2024-08-20 RX ORDER — APIXABAN 5 MG/1
5 TABLET, FILM COATED ORAL
Qty: 180 TABLET | Refills: 1 | Status: SHIPPED | OUTPATIENT
Start: 2024-08-20

## 2024-08-20 RX ORDER — LEVOTHYROXINE SODIUM 50 UG/1
50 TABLET ORAL DAILY
Qty: 90 TABLET | Refills: 1 | Status: SHIPPED | OUTPATIENT
Start: 2024-08-20

## 2024-09-05 ASSESSMENT — ANXIETY QUESTIONNAIRES
GAD7 TOTAL SCORE: 0
8. IF YOU CHECKED OFF ANY PROBLEMS, HOW DIFFICULT HAVE THESE MADE IT FOR YOU TO DO YOUR WORK, TAKE CARE OF THINGS AT HOME, OR GET ALONG WITH OTHER PEOPLE?: NOT DIFFICULT AT ALL
7. FEELING AFRAID AS IF SOMETHING AWFUL MIGHT HAPPEN: NOT AT ALL
GAD7 TOTAL SCORE: 0
GAD7 TOTAL SCORE: 0

## 2024-09-10 ENCOUNTER — OFFICE VISIT (OUTPATIENT)
Dept: OBGYN | Facility: CLINIC | Age: 79
End: 2024-09-10
Attending: INTERNAL MEDICINE
Payer: COMMERCIAL

## 2024-09-10 VITALS
SYSTOLIC BLOOD PRESSURE: 134 MMHG | HEIGHT: 64 IN | WEIGHT: 172 LBS | HEART RATE: 85 BPM | BODY MASS INDEX: 29.37 KG/M2 | OXYGEN SATURATION: 96 % | DIASTOLIC BLOOD PRESSURE: 86 MMHG

## 2024-09-10 DIAGNOSIS — R93.89 THICKENED ENDOMETRIUM: ICD-10-CM

## 2024-09-10 PROCEDURE — 99203 OFFICE O/P NEW LOW 30 MIN: CPT | Performed by: OBSTETRICS & GYNECOLOGY

## 2024-09-10 NOTE — PROGRESS NOTES
CC: Bharti Peña is here secondary to a thickened endometrium on CT scan.    HPI: The pt is a 79 year old WWF who presents with a CT scan with endometrial thickening on CT scan from 4/30/24.  She is here with her daughter Hanna.  She had the CT scan secondary to microscopic hematuria.  It showed a 12 mm endometrium.  She had previously had an ultrasound on 7/10/23 that showed a 2 mm endometrium.  She denies any vaginal bleeding.    Past Medical History:   Diagnosis Date    Heart disease July 2023    Hypertension     Thyroid disease        Past Surgical History:   Procedure Laterality Date    COLONOSCOPY      ORTHOPEDIC SURGERY         Patient's   Family History   Problem Relation Age of Onset    Hypertension Brother        Patient   Social History     Socioeconomic History    Marital status:      Spouse name: None    Number of children: None    Years of education: None    Highest education level: None   Tobacco Use    Smoking status: Never     Passive exposure: Past    Smokeless tobacco: Never   Vaping Use    Vaping status: Never Used   Substance and Sexual Activity    Alcohol use: Not Currently    Drug use: Not Currently   Other Topics Concern    Parent/sibling w/ CABG, MI or angioplasty before 65F 55M? No     Social Determinants of Health     Financial Resource Strain: Low Risk  (12/21/2023)    Financial Resource Strain     Within the past 12 months, have you or your family members you live with been unable to get utilities (heat, electricity) when it was really needed?: No   Food Insecurity: Low Risk  (12/21/2023)    Food Insecurity     Within the past 12 months, did you worry that your food would run out before you got money to buy more?: No     Within the past 12 months, did the food you bought just not last and you didn t have money to get more?: No   Transportation Needs: Low Risk  (12/21/2023)    Transportation Needs     Within the past 12 months, has lack of transportation kept you from medical  appointments, getting your medicines, non-medical meetings or appointments, work, or from getting things that you need?: No   Housing Stability: Low Risk  (12/21/2023)    Housing Stability     Do you have housing? : Yes     Are you worried about losing your housing?: No       Outpatient Medications Prior to Visit   Medication Sig Dispense Refill    acetaminophen (TYLENOL) 500 MG tablet Take 500-1,000 mg by mouth every 6 hours as needed for mild pain      brimonidine (ALPHAGAN) 0.2 % ophthalmic solution Place 1 drop into both eyes 3 times daily      calcium carbonate (OS-TAMIKO) 1500 (600 Ca) MG tablet Take 600 mg by mouth 2 times daily (with meals)      cholecalciferol (VITAMIN D3) 25 mcg (1000 units) capsule Take 1 capsule by mouth daily Evening      coenzyme Q-10 capsule Take 1 capsule by mouth daily  120 mg      ELIQUIS ANTICOAGULANT 5 MG tablet Take 1 tablet (5 mg) by mouth 2 times daily 180 tablet 1    fenofibrate (TRIGLIDE/LOFIBRA) 160 MG tablet Take 1 tablet (160 mg) by mouth daily 90 tablet 3    fish oil-omega-3 fatty acids 500 MG capsule Take 1 capsule by mouth 2 times daily      latanoprost (XALATAN) 0.005 % ophthalmic solution Place 1 drop into both eyes daily      levothyroxine (SYNTHROID/LEVOTHROID) 50 MCG tablet Take 1 tablet (50 mcg) by mouth daily 90 tablet 1    methylPREDNISolone (MEDROL) 32 MG tablet Take 1 tablet (32 mg) by mouth daily 12 hours prior to the procedure with IV contrast 1 tablet 0    metoprolol succinate ER (TOPROL XL) 50 MG 24 hr tablet Take 1 tablet (50 mg) by mouth daily 90 tablet 1    multivitamin w/minerals (THERA-VIT-M) tablet Take 1 tablet by mouth daily      netarsudil (RHOPRESSA) 0.02 % ophthalmic solution Place 1 drop into both eyes at bedtime      raloxifene (EVISTA) 60 MG tablet Take 1 tablet (60 mg) by mouth daily 90 tablet 0    red yeast rice 600 MG CAPS Take 600 mg by mouth daily      traZODone (DESYREL) 150 MG tablet Take 2-3 tablets (300-450 mg) by mouth at bedtime 90  "tablet 1    traZODone (DESYREL) 150 MG tablet Take 2-3 tablets (300-450 mg) by mouth at bedtime 90 tablet 0    vitamin D3 (CHOLECALCIFEROL) 50 mcg (2000 units) tablet Take 1 tablet by mouth 2 times daily      Vitamin E 90 MG (200 UNIT) capsule Take 200 Units by mouth daily      methylPREDNISolone (MEDROL) 32 MG tablet Take 1 tablet (32 mg) by mouth daily 2 hours prior to the procedure with IV contrast 1 tablet 0     No facility-administered medications prior to visit.       Patient is allergic to iodinated contrast media, iodine, latex, and temazepam.    ROS:  12 part ROS is negative aside from those symptoms in the HPI    PE:  /86 (BP Location: Right arm, Patient Position: Sitting, Cuff Size: Adult Regular)   Pulse 85   Ht 1.626 m (5' 4\")   Wt 78 kg (172 lb)   LMP  (LMP Unknown)           Body mass index is 29.52 kg/m .    General: overweight WF, NAD  Psych: normal mood  Neuro: CN I-XII grossly intact  MS: normal gait    Assessment: 79 year old WWF with a thickened endometrium on CT scan from 5 months ago without bleeding.    Plan: Natural history of causes for thickening on scans discussed with the patient.  We discussed options of endometrial biopsy today or pelvic ultrasound.  We discussed that if ultrasound is done and the endometrium still appears thickened, we would still need to do the EMB.  If that's the case, I would recommend misoprostol prior to the procedure.  Questions were answered to the best of my ability.      Answers submitted by the patient for this visit:  Patient Health Questionnaire (G7) (Submitted on 9/5/2024)  MARILU 7 TOTAL SCORE: 0    "

## 2024-09-12 ENCOUNTER — HOSPITAL ENCOUNTER (OUTPATIENT)
Dept: ULTRASOUND IMAGING | Facility: CLINIC | Age: 79
Discharge: HOME | End: 2024-09-12
Attending: OBSTETRICS & GYNECOLOGY | Admitting: OBSTETRICS & GYNECOLOGY
Payer: COMMERCIAL

## 2024-09-12 DIAGNOSIS — R93.89 THICKENED ENDOMETRIUM: ICD-10-CM

## 2024-09-12 PROCEDURE — 76856 US EXAM PELVIC COMPLETE: CPT

## 2024-09-12 PROCEDURE — 76830 TRANSVAGINAL US NON-OB: CPT

## 2024-09-25 DIAGNOSIS — M81.0 AGE-RELATED OSTEOPOROSIS WITHOUT CURRENT PATHOLOGICAL FRACTURE: ICD-10-CM

## 2024-09-25 NOTE — TELEPHONE ENCOUNTER
FAX [facility] Prescription Refill Request      Disp Refills Start End TJ    raloxifene (EVISTA) 60 MG tablet 90 tablet 0 6/21/2024 -- No

## 2024-09-26 RX ORDER — RALOXIFENE HYDROCHLORIDE 60 MG/1
60 TABLET, FILM COATED ORAL DAILY
Qty: 90 TABLET | Refills: 0 | Status: SHIPPED | OUTPATIENT
Start: 2024-09-26 | End: 2024-10-01

## 2024-09-27 DIAGNOSIS — G47.00 INSOMNIA, UNSPECIFIED TYPE: ICD-10-CM

## 2024-09-27 RX ORDER — TRAZODONE HYDROCHLORIDE 150 MG/1
300-450 TABLET ORAL AT BEDTIME
Qty: 90 TABLET | Refills: 1 | Status: SHIPPED | OUTPATIENT
Start: 2024-09-27

## 2024-09-27 SDOH — HEALTH STABILITY: PHYSICAL HEALTH: ON AVERAGE, HOW MANY DAYS PER WEEK DO YOU ENGAGE IN MODERATE TO STRENUOUS EXERCISE (LIKE A BRISK WALK)?: 0 DAYS

## 2024-09-27 ASSESSMENT — SOCIAL DETERMINANTS OF HEALTH (SDOH): HOW OFTEN DO YOU GET TOGETHER WITH FRIENDS OR RELATIVES?: TWICE A WEEK

## 2024-09-27 NOTE — TELEPHONE ENCOUNTER
Pharmacy calling to get a medication refill on medications attached     Disp Refills Start End TJ   traZODone (DESYREL) 150 MG tablet 90 tablet 1 6/24/2024 -- No   Sig - Route: Take 2-3 tablets (300-450 mg) by mouth at bedtime - Oral   Sent to pharmacy as: traZODone HCl 150 MG Oral Tablet (DESYREL)   Class: E-Prescribe   Order: [id number]   E-Prescribing Status: Receipt confirmed by pharmacy (6/24/2024 10:33 AM CDT)

## 2024-10-01 ENCOUNTER — OFFICE VISIT (OUTPATIENT)
Dept: FAMILY MEDICINE | Facility: CLINIC | Age: 79
End: 2024-10-01
Payer: COMMERCIAL

## 2024-10-01 VITALS
HEIGHT: 65 IN | TEMPERATURE: 98.1 F | BODY MASS INDEX: 29.16 KG/M2 | OXYGEN SATURATION: 96 % | SYSTOLIC BLOOD PRESSURE: 120 MMHG | RESPIRATION RATE: 15 BRPM | DIASTOLIC BLOOD PRESSURE: 68 MMHG | WEIGHT: 175 LBS | HEART RATE: 83 BPM

## 2024-10-01 DIAGNOSIS — Z85.820 HISTORY OF MELANOMA: ICD-10-CM

## 2024-10-01 DIAGNOSIS — M81.0 AGE-RELATED OSTEOPOROSIS WITHOUT CURRENT PATHOLOGICAL FRACTURE: ICD-10-CM

## 2024-10-01 DIAGNOSIS — Z00.00 ENCOUNTER FOR MEDICARE ANNUAL WELLNESS EXAM: Primary | ICD-10-CM

## 2024-10-01 DIAGNOSIS — Z23 ENCOUNTER FOR VACCINATION: ICD-10-CM

## 2024-10-01 DIAGNOSIS — E03.9 HYPOTHYROIDISM, UNSPECIFIED TYPE: ICD-10-CM

## 2024-10-01 LAB
ERYTHROCYTE [DISTWIDTH] IN BLOOD BY AUTOMATED COUNT: 13 % (ref 10–15)
HCT VFR BLD AUTO: 44.5 % (ref 35–47)
HGB BLD-MCNC: 15.8 G/DL (ref 11.7–15.7)
MCH RBC QN AUTO: 34.5 PG (ref 26.5–33)
MCHC RBC AUTO-ENTMCNC: 35.5 G/DL (ref 31.5–36.5)
MCV RBC AUTO: 97 FL (ref 78–100)
PLATELET # BLD AUTO: 311 10E3/UL (ref 150–450)
RBC # BLD AUTO: 4.58 10E6/UL (ref 3.8–5.2)
WBC # BLD AUTO: 8.3 10E3/UL (ref 4–11)

## 2024-10-01 PROCEDURE — 84443 ASSAY THYROID STIM HORMONE: CPT | Performed by: FAMILY MEDICINE

## 2024-10-01 PROCEDURE — 36415 COLL VENOUS BLD VENIPUNCTURE: CPT | Performed by: FAMILY MEDICINE

## 2024-10-01 PROCEDURE — 80048 BASIC METABOLIC PNL TOTAL CA: CPT | Performed by: FAMILY MEDICINE

## 2024-10-01 PROCEDURE — G0439 PPPS, SUBSEQ VISIT: HCPCS | Performed by: FAMILY MEDICINE

## 2024-10-01 PROCEDURE — 99214 OFFICE O/P EST MOD 30 MIN: CPT | Mod: 25 | Performed by: FAMILY MEDICINE

## 2024-10-01 PROCEDURE — 85027 COMPLETE CBC AUTOMATED: CPT | Performed by: FAMILY MEDICINE

## 2024-10-01 RX ORDER — RALOXIFENE HYDROCHLORIDE 60 MG/1
60 TABLET, FILM COATED ORAL DAILY
Qty: 90 TABLET | Refills: 3 | Status: SHIPPED | OUTPATIENT
Start: 2024-10-01

## 2024-10-01 NOTE — RESULT ENCOUNTER NOTE
Hello -    Here are my comments about your recent results:  -Normal red blood cell (hgb) levels, normal white blood cell count and normal platelet levels. Even with the hemoglobin somewhat high, I am not concerned.   For additional lab test information, [email or web address].org is an excellent reference..    Please let us know if you have any questions or concerns.     Regards,  CATARINA ZAVALA, DO

## 2024-10-01 NOTE — PATIENT INSTRUCTIONS
Patient Education   Preventive Care Advice   This is general advice given by our system to help you stay healthy. However, your care team may have specific advice just for you. Please talk to your care team about your preventive care needs.  Nutrition  Eat 5 or more servings of fruits and vegetables each day.  Try wheat bread, brown rice and whole grain pasta (instead of white bread, rice, and pasta).  Get enough calcium and vitamin D. Check the label on foods and aim for 100% of the RDA (recommended daily allowance).  Lifestyle  Exercise at least 150 minutes each week  (30 minutes a day, 5 days a week).  Do muscle strengthening activities 2 days a week. These help control your weight and prevent disease.  No smoking.  Wear sunscreen to prevent skin cancer.  Have a dental exam and cleaning every 6 months.  Yearly exams  See your health care team every year to talk about:  Any changes in your health.  Any medicines your care team has prescribed.  Preventive care, family planning, and ways to prevent chronic diseases.  Shots (vaccines)   HPV shots (up to age 26), if you've never had them before.  Hepatitis B shots (up to age 59), if you've never had them before.  COVID-19 shot: Get this shot when it's due.  Flu shot: Get a flu shot every year.  Tetanus shot: Get a tetanus shot every 10 years.  Pneumococcal, hepatitis A, and RSV shots: Ask your care team if you need these based on your risk.  Shingles shot (for age 50 and up)  General health tests  Diabetes screening:  Starting at age 35, Get screened for diabetes at least every 3 years.  If you are younger than age 35, ask your care team if you should be screened for diabetes.  Cholesterol test: At age 39, start having a cholesterol test every 5 years, or more often if advised.  Bone density scan (DEXA): At age 50, ask your care team if you should have this scan for osteoporosis (brittle bones).  Hepatitis C: Get tested at least once in your life.  STIs (sexually  transmitted infections)  Before age 24: Ask your care team if you should be screened for STIs.  After age 24: Get screened for STIs if you're at risk. You are at risk for STIs (including HIV) if:  You are sexually active with more than one person.  You don't use condoms every time.  You or a partner was diagnosed with a sexually transmitted infection.  If you are at risk for HIV, ask about PrEP medicine to prevent HIV.  Get tested for HIV at least once in your life, whether you are at risk for HIV or not.  Cancer screening tests  Cervical cancer screening: If you have a cervix, begin getting regular cervical cancer screening tests starting at age 21.  Breast cancer scan (mammogram): If you've ever had breasts, begin having regular mammograms starting at age 40. This is a scan to check for breast cancer.  Colon cancer screening: It is important to start screening for colon cancer at age 45.  Have a colonoscopy test every 10 years (or more often if you're at risk) Or, ask your provider about stool tests like a FIT test every year or Cologuard test every 3 years.  To learn more about your testing options, visit:   .  For help making a decision, visit:   https://bit.ly/sv97460.  Prostate cancer screening test: If you have a prostate, ask your care team if a prostate cancer screening test (PSA) at age 55 is right for you.  Lung cancer screening: If you are a current or former smoker ages 50 to 80, ask your care team if ongoing lung cancer screenings are right for you.  For informational purposes only. Not to replace the advice of your health care provider. Copyright   2023 [facility] [employer]. All rights reserved. Clinically reviewed by the [facility]. [employer] 582127 - REV 01/24.  Bladder Training: Care Instructions  Your Care Instructions     Bladder training is used to treat urge incontinence and stress incontinence. Urge incontinence means that the need to urinate comes on so fast  that you can't get to a toilet in time. Stress incontinence means that you leak urine because of pressure on your bladder. For example, it may happen when you laugh, cough, or lift something heavy.  Bladder training can increase how long you can wait before you have to urinate. It can also help your bladder hold more urine. And it can give you better control over the urge to urinate.  It is important to remember that bladder training takes a few weeks to a few months to make a difference. You may not see results right away, but don't give up.  Follow-up care is a key part of your treatment and safety. Be sure to make and go to all appointments, and call your doctor if you are having problems. It's also a good idea to know your test results and keep a list of the medicines you take.  How can you care for yourself at home?  Work with your doctor to come up with a bladder training program that is right for you. You may use one or more of the following methods.  Delayed urination  In the beginning, try to keep from urinating for 5 minutes after you first feel the need to go.  While you wait, take deep, slow breaths to relax. Kegel exercises can also help you delay the need to go to the bathroom.  After some practice, when you can easily wait 5 minutes to urinate, try to wait 10 minutes before you urinate.  Slowly increase the waiting period until you are able to control when you have to urinate.  Scheduled urination  Empty your bladder when you first wake up in the morning.  Schedule times throughout the day when you will urinate.  Start by going to the bathroom every hour, even if you don't need to go.  Slowly increase the time between trips to the bathroom.  When you have found a schedule that works well for you, keep doing it.  If you wake up during the night and have to urinate, do it. Apply your schedule to waking hours only.  Kegel exercises  These tighten and strengthen pelvic muscles, which can help you control  "the flow of urine. (If doing these exercises causes pain, stop doing them and talk with your doctor.) To do Kegel exercises:  Squeeze your muscles as if you were trying not to pass gas. Or squeeze your muscles as if you were stopping the flow of urine. Your belly, legs, and buttocks shouldn't move.  Hold the squeeze for 3 seconds, then relax for 5 to 10 seconds.  Start with 3 seconds, then add 1 second each week until you are able to squeeze for 10 seconds.  Repeat the exercise 10 times a session. Do 3 to 8 sessions a day.  When should you call for help?  Watch closely for changes in your health, and be sure to contact your doctor if:    Your incontinence is getting worse.     You do not get better as expected.   Where can you learn more?  Go to https://www.[employer].net/patiented  Enter V684 in the search box to learn more about \"Bladder Training: Care Instructions.\"  Current as of: November 15, 2023               Content Version: 14.0    5789-3189 [employer].   Care instructions adapted under license by your healthcare professional. If you have questions about a medical condition or this instruction, always ask your healthcare professional. [employer] disclaims any warranty or liability for your use of this information.         "

## 2024-10-01 NOTE — Clinical Note
Richar Quinonez- I saw our mutual patient and her daughter. The focus of the visit was to reduce polypharmacy. I am OK discontinuing eliquis from a DVT stand point, are you OK with stopping it for a fib? It appears it was paroxysmal. Also, would you be OK with me stopping fenofibrate, as I am unaware of her having CAD and from a primary prevention standpoint benefits are limited. Thanks!

## 2024-10-01 NOTE — PROGRESS NOTES
"Preventive Care Visit  [facility]  CATARINA ZAVALA DO, Family Medicine  Oct 1, 2024    Assessment & Plan     Encounter for Medicare annual wellness exam  Patient here for well exam. Discussed vaccines and immunizations recommended. Patient declines at this time. She would also like to reduce medication burden. Reached out to specialist.     Hypothyroidism, unspecified type  Chronic, stable. Recommend continuing 50 mcg levothyroxine PO every day.   - TSH with free T4 reflex  - CBC with platelets  - Basic metabolic panel  (Ca, Cl, CO2, Creat, Gluc, K, Na, BUN)  - TSH with free T4 reflex  - CBC with platelets  - Basic metabolic panel  (Ca, Cl, CO2, Creat, Gluc, K, Na, BUN)    Age-related osteoporosis without current pathological fracture  Chronic, stable.   - raloxifene (EVISTA) 60 MG tablet  Dispense: 90 tablet; Refill: 3    History of melanoma  Chronic, in remission. Patient lost to follow up. Recommend she re-establish care for skin exam.   - Adult Dermatology  Referral      Patient has been advised of split billing requirements and indicates understanding: Yes        BMI  Estimated body mass index is 29.57 kg/m  as calculated from the following:    Height as of this encounter: 1.638 m (5' 4.5\").    Weight as of this encounter: 79.4 kg (175 lb).       Counseling  Appropriate preventive services were addressed with this patient via screening, questionnaire, or discussion as appropriate for fall prevention, nutrition, physical activity, Tobacco-use cessation, social engagement, weight loss and cognition.  Checklist reviewing preventive services available has been given to the patient.      See Patient Instructions    Ubaldo Hay is a 79 year old, presenting for the following:  Annual Visit        10/1/2024     2:53 PM   Additional Questions   Roomed by Aisha   Accompanied by daughter       Health Care Directive  Patient does not have a Health Care Directive or Living " Will: Discussed advance care planning with patient; information given to patient to review.    HPI    Chronic low back pain   -Patient has occasional pain in lumbar spine.         9/27/2024   General Health   How would you rate your overall physical health? Good   Feel stress (tense, anxious, or unable to sleep) Only a little      (!) STRESS CONCERN      9/27/2024   Nutrition   Diet: Regular (no restrictions)            9/27/2024   Exercise   Days per week of moderate/strenous exercise 0 days      (!) EXERCISE CONCERN      9/27/2024   Social Factors   Frequency of gathering with friends or relatives Twice a week   Worry food won't last until get money to buy more No   Food not last or not have enough money for food? No   Do you have housing? (Housing is defined as stable permanent housing and does not include staying ouside in a car, in a tent, in an abandoned building, in an overnight shelter, or couch-surfing.) Yes   Are you worried about losing your housing? No   Lack of transportation? No   Unable to get utilities (heat,electricity)? No            9/27/2024   Fall Risk   Fallen 2 or more times in the past year? No   Trouble with walking or balance? No             9/27/2024   Activities of Daily Living- Home Safety   Needs help with the following daily activites None of the above   Safety concerns in the home None of the above            9/27/2024   Dental   Dentist two times every year? (!) NO            9/27/2024   Hearing Screening   Hearing concerns? None of the above            9/27/2024   Driving Risk Screening   Patient/family members have concerns about driving No            9/27/2024   General Alertness/Fatigue Screening   Have you been more tired than usual lately? No            9/27/2024   Urinary Incontinence Screening   Bothered by leaking urine in past 6 months Yes            9/27/2024   TB Screening   Were you born outside of the [place]? No            Today's PHQ-2 Score:       10/1/2024     2:43 PM    PHQ-2 ( 1999 [employer])   Q1: Little interest or pleasure in doing things 0   Q2: Feeling down, depressed or hopeless 0   PHQ-2 Score 0   Q1: Little interest or pleasure in doing things Not at all   Q2: Feeling down, depressed or hopeless Not at all   PHQ-2 Score 0           9/27/2024   Substance Use   Alcohol more than 3/day or more than 7/wk No   Do you have a current opioid prescription? No   How severe/bad is pain from 1 to 10? 0/10 (No Pain)   Do you use any other substances recreationally? No        Social History     Tobacco Use    Smoking status: Never     Passive exposure: Past    Smokeless tobacco: Never   Vaping Use    Vaping status: Never Used   Substance Use Topics    Alcohol use: Not Currently    Drug use: Not Currently          Mammogram Screening - After age 74- determine frequency with patient based on health status, life expectancy and patient goals        Reviewed and updated as needed this visit by Provider   Tobacco  Allergies  Meds  Problems  Med Hx  Surg Hx  Fam Hx     Sexual Activity          Past Medical History:   Diagnosis Date    Heart disease July 2023    Hypertension     Thyroid disease      Past Surgical History:   Procedure Laterality Date    COLONOSCOPY      ORTHOPEDIC SURGERY       Current providers sharing in care for this patient include:  Patient Care Team:  Michelle Thompson DO as PCP - General (Family Medicine)  Michelle Thompson DO as Assigned PCP  Addis Quinonez MD as MD (Cardiovascular Disease)  Nicki Hill PA-C as Physician Assistant (Urology)  Guru Owen Noland MD as MD (Gastroenterology)  Addis Quinonez MD as Assigned Heart and Vascular Provider  Ab Govea MD as Assigned Surgical Provider  Cami Miramontes MD (OB/Gyn)  Cami Miramontes MD as Assigned OBGYN Provider    The following health maintenance items are reviewed in Epic and correct as of today:  Health Maintenance   Topic Date Due    Pneumococcal Vaccine: 65+  "Years (1 of 2 - PCV) Never done    DTAP/TDAP/TD IMMUNIZATION (1 - Tdap) Never done    ZOSTER IMMUNIZATION (2 of 3) 02/26/2008    RSV VACCINE (1 - 1-dose 75+ series) Never done    COVID-19 Vaccine (4 - 2024-25 season) 09/01/2024    INFLUENZA VACCINE (1) 06/30/2025 (Originally 9/1/2024)    ANNUAL REVIEW OF HM ORDERS  12/22/2024    MEDICARE ANNUAL WELLNESS VISIT  10/01/2025    BMP  10/01/2025    TSH W/FREE T4 REFLEX  10/01/2025    FALL RISK ASSESSMENT  10/01/2025    ADVANCE CARE PLANNING  10/01/2029    DEXA  03/02/2038    PHQ-2 (once per calendar year)  Completed    HPV IMMUNIZATION  Aged Out    MENINGITIS IMMUNIZATION  Aged Out    RSV MONOCLONAL ANTIBODY  Aged Out         Review of Systems  Constitutional, neuro, ENT, endocrine, pulmonary, cardiac, gastrointestinal, genitourinary, musculoskeletal, integument and psychiatric systems are negative, except as otherwise noted.     Objective    Exam  /68   Pulse 83   Temp 98.1  F (36.7  C) (Oral)   Resp 15   Ht 1.638 m (5' 4.5\")   Wt 79.4 kg (175 lb)   LMP  (LMP Unknown)   SpO2 96%   BMI 29.57 kg/m     Estimated body mass index is 29.57 kg/m  as calculated from the following:    Height as of this encounter: 1.638 m (5' 4.5\").    Weight as of this encounter: 79.4 kg (175 lb).    Physical Exam  GENERAL: alert and no distress  EYES: Eyes grossly normal to inspection, PERRL and conjunctivae and sclerae normal  HENT: ear canals and TM's normal, nose and mouth without ulcers or lesions  NECK: no adenopathy, no asymmetry, masses, or scars  RESP: lungs clear to auscultation - no rales, rhonchi or wheezes  CV: regular rate and rhythm, normal S1 S2, no S3 or S4, no murmur, click or rub, no peripheral edema  ABDOMEN: soft, nontender, no hepatosplenomegaly, no masses and bowel sounds normal  MS: no gross musculoskeletal defects noted, no edema  SKIN: no suspicious lesions or rashes        10/1/2024   Mini Cog   Clock Draw Score 0 Abnormal   3 Item Recall 2 objects " recalled   Mini Cog Total Score 2                 Signed Electronically by: CATARINA ZAVALA DO

## 2024-10-02 LAB
ANION GAP SERPL CALCULATED.3IONS-SCNC: 12 MMOL/L (ref 7–15)
BUN SERPL-MCNC: 21.3 MG/DL (ref 8–23)
CALCIUM SERPL-MCNC: 10.4 MG/DL (ref 8.8–10.4)
CHLORIDE SERPL-SCNC: 99 MMOL/L (ref 98–107)
CREAT SERPL-MCNC: 0.88 MG/DL (ref 0.51–0.95)
EGFRCR SERPLBLD CKD-EPI 2021: 66 ML/MIN/1.73M2
GLUCOSE SERPL-MCNC: 96 MG/DL (ref 70–99)
HCO3 SERPL-SCNC: 24 MMOL/L (ref 22–29)
POTASSIUM SERPL-SCNC: 4.1 MMOL/L (ref 3.4–5.3)
SODIUM SERPL-SCNC: 135 MMOL/L (ref 135–145)
TSH SERPL DL<=0.005 MIU/L-ACNC: 2.97 UIU/ML (ref 0.3–4.2)

## 2024-10-02 NOTE — RESULT ENCOUNTER NOTE
Hello -    Here are my comments about your recent results:  -Kidney function is normal (Cr, GFR), Sodium is normal, Potassium is normal, Calcium is normal, Glucose is normal.   -TSH (thyroid stimulating hormone) level is normal which indicates normal thyroid function.  For additional lab test information, [email or web address] is an excellent reference..    Please let us know if you have any questions or concerns.     Regards,  CATARINA ZAVALA, DO

## 2024-10-11 ENCOUNTER — TELEPHONE (OUTPATIENT)
Dept: FAMILY MEDICINE | Facility: CLINIC | Age: 79
End: 2024-10-11
Payer: COMMERCIAL

## 2024-10-11 NOTE — PROGRESS NOTES
Please let patient and daughter know I discussed with cardiology regarding medication. He would like to continue the eliquis until she at least follows up with him to assess her afib burden.    For the fenofibrate she could discontinue, we should do a repeat lab in 3-6 months.

## 2024-10-11 NOTE — TELEPHONE ENCOUNTER
Called patient and relayed PCP message below to her. Patient verbalized understanding, expresses no concerns and thanked for call.     Author: Michelle Thompson DO Service: -- Author Type: Physician   Filed: 10/11/2024  2:50 PM Encounter Date: 10/1/2024 Status: Signed   : Michelle Thompson DO (Physician)     Please let patient and daughter know I discussed with cardiology regarding medication. He would like to continue the eliquis until she at least follows up with him to assess her afib burden.     For the fenofibrate she could discontinue, we should do a repeat lab in 3-6 months.

## 2024-10-18 NOTE — TELEPHONE ENCOUNTER
General Call    Contacts       Contact Date/Time Type Contact Phone/Fax    10/11/2024 02:55 PM CDT Phone (Outgoing) Bharti Peña (Self) [phone] (H)    Talked with Patient     10/18/2024 02:50 PM CDT Phone (Incoming) Bharti Peña (Self) [phone] (H)          Reason for Call:  patient called and would like to discuss some more about the message below.     What are your questions or concerns:  please call patient for clarification     Date of last appointment with provider: 10/01/24    Could we send this information to you in [facility] or would you prefer to receive a phone call?:   Patient would prefer a phone call   Okay to leave a detailed message?: Yes at Cell number on file:    Telephone Information:   Mobile [phone]    *please call in the afternoon times.

## 2024-10-21 NOTE — TELEPHONE ENCOUNTER
Called daughter to make sure we're on the same page since patient seemed to have a different understanding.     Relayed Dr. Durán's message below which was news to daughter. She said the cardiologist had told them back in April that Dr. Durán could decide to stop the Eliquis. She said Bharti didn't need to see cardiology again per cardiologist. Explained further that Dr. Durán said she spoke with cardiologist about the Eliquis on 10/1/24 per note and that cardiologist wants patient to continue the Eliquis and follow-up with them to assess afib.     She will speak with patient also about the fenofibrate and call back to schedule.

## 2024-10-21 NOTE — TELEPHONE ENCOUNTER
Called patient back.    Patient seems confused as she said the cardiologist told her she could stop the Eliquis and didn't need to see cardiology again. Patient is wanting to stop the Eliquis.    Relayed provider note below and that she should continue the eliquis until she at least follows up with cards to assess her afib burden. Writer advised that she shouldn't suddenly stop the Eliquis. Provided cardiology phone number for scheduling. Patient verbalized understanding and wants to talk with her daughter and will call back with any questions or concerns.

## 2024-10-27 DIAGNOSIS — I48.20 CHRONIC ATRIAL FIBRILLATION (H): ICD-10-CM

## 2024-10-27 NOTE — TELEPHONE ENCOUNTER
FAX [facility] Prescription Refill Request    Requesting 90 Day Qty with 3 Refills if appropraite    Last Visit with PCP = YAHIR 10/01/24    metoprolol succinate ER (TOPROL XL) 50 MG 24 hr tablet

## 2024-10-28 RX ORDER — METOPROLOL SUCCINATE 50 MG/1
50 TABLET, EXTENDED RELEASE ORAL DAILY
Qty: 90 TABLET | Refills: 3 | Status: SHIPPED | OUTPATIENT
Start: 2024-10-28

## 2024-11-04 ENCOUNTER — OFFICE VISIT (OUTPATIENT)
Dept: CARDIOLOGY | Facility: CLINIC | Age: 79
End: 2024-11-04
Payer: COMMERCIAL

## 2024-11-04 VITALS
SYSTOLIC BLOOD PRESSURE: 134 MMHG | OXYGEN SATURATION: 96 % | BODY MASS INDEX: 29.41 KG/M2 | HEART RATE: 101 BPM | DIASTOLIC BLOOD PRESSURE: 86 MMHG | WEIGHT: 174 LBS

## 2024-11-04 DIAGNOSIS — I48.0 PAROXYSMAL ATRIAL FIBRILLATION (H): Primary | ICD-10-CM

## 2024-11-04 DIAGNOSIS — I26.99 ACUTE PULMONARY EMBOLISM WITHOUT ACUTE COR PULMONALE, UNSPECIFIED PULMONARY EMBOLISM TYPE (H): ICD-10-CM

## 2024-11-04 DIAGNOSIS — I10 ESSENTIAL HYPERTENSION, BENIGN: ICD-10-CM

## 2024-11-04 PROCEDURE — 99214 OFFICE O/P EST MOD 30 MIN: CPT | Performed by: INTERNAL MEDICINE

## 2024-11-04 PROCEDURE — G2211 COMPLEX E/M VISIT ADD ON: HCPCS | Performed by: INTERNAL MEDICINE

## 2024-11-04 RX ORDER — OFLOXACIN 3 MG/ML
SOLUTION/ DROPS OPHTHALMIC
COMMUNITY
Start: 2024-10-23

## 2024-11-04 NOTE — PATIENT INSTRUCTIONS
Ms Bharti Peña,  I enjoyed visiting with you again today.  I am glad to hear you are doing well.  Per our conversation let us get the heart monitor and also let the eye docs know about the ELIQUIS.  I will plan on seeing you 6 months.  Guillermo Quinonez

## 2024-11-04 NOTE — LETTER
11/4/2024    CATARINA CRUZ MAYAHarmonyDIONE, DO  [address] [address]    RE: Bharti Peña       Dear Colleague,     I had the pleasure of seeing Bharti Peña in the [facility].      [facility]  Heart Care Clinic Follow-up Note    Assessment & Plan        (I48.0) Paroxysmal atrial fibrillation (H)  (primary encounter diagnosis)  Comment: Truly paroxysmal, most likely not valvular, and asymptomatic.  This is based on prior echo. Will continue Eliquis.  Her CHADS2 Vascore is 6 given her at 7.2 to 12.2% risk of a stroke a year.  Should she have recurrence would consider antiarrhythmic.  Continue metoprolol.  She is worried about getting off Eliquis, I will arrange for event monitor and if we do not see any recurrent atrial fibrillation then discuss further with her although sounds like she was not willing to accept the risk of a stroke..     (I10) Essential hypertension, benign  Comment: Under good control currently on metoprolol.  Okay with blood pressures running about 140 systolic.     (I26.99) Acute pulmonary embolism, unspecified pulmonary embolism type, unspecified whether acute cor pulmonale present (H)  Comment: So noted, felt to be due to right hip fracture and DVT that developed thereafter.  This might have been the instigator for the A-fib.      Plan  1.  2-week Zio patch monitor.  2.  Follow-up in 6 months.  3.  Hold Eliquis for upcoming eye surgery.  4.  If she really wants to discontinue Eliquis we will make decision together.    The longitudinal plan of care for the diagnosis(es)/condition(s) as documented were addressed during this visit. Due to the added complexity in care, I will continue to support Bharti in the subsequent management and with ongoing continuity of care.     Subjective  CC: 79-year-old white female being seen in follow-up.  Since I seen him her primary doctor is stated she does not need to take Eliquis for the pulmonary embolism.  Patient  would like to stop Eliquis for the A-fib.  She did not have any significant chest pain, palpitations, syncope, dizziness or peripheral edema.  She is here with her daughter today.  She cannot tell me that she is bleeding just does not like the idea of taking a blood thinner.    Medications  Current Outpatient Medications   Medication Sig Dispense Refill     acetaminophen (TYLENOL) 500 MG tablet Take 500-1,000 mg by mouth every 6 hours as needed for mild pain       brimonidine (ALPHAGAN) 0.2 % ophthalmic solution Place 1 drop into both eyes 3 times daily       calcium carbonate (OS-TAMIKO) 1500 (600 Ca) MG tablet Take 600 mg by mouth 2 times daily (with meals)       cholecalciferol (VITAMIN D3) 25 mcg (1000 units) capsule Take 1 capsule by mouth daily Evening       coenzyme Q-10 capsule Take 1 capsule by mouth daily  120 mg       ELIQUIS ANTICOAGULANT 5 MG tablet Take 1 tablet (5 mg) by mouth 2 times daily 180 tablet 1     fenofibrate (TRIGLIDE/LOFIBRA) 160 MG tablet Take 1 tablet (160 mg) by mouth daily 90 tablet 3     fish oil-omega-3 fatty acids 500 MG capsule Take 1 capsule by mouth 2 times daily       latanoprost (XALATAN) 0.005 % ophthalmic solution Place 1 drop into both eyes daily       levothyroxine (SYNTHROID/LEVOTHROID) 50 MCG tablet Take 1 tablet (50 mcg) by mouth daily 90 tablet 1     metoprolol succinate ER (TOPROL XL) 50 MG 24 hr tablet Take 1 tablet (50 mg) by mouth daily. 90 tablet 3     multivitamin w/minerals (THERA-VIT-M) tablet Take 1 tablet by mouth daily       netarsudil (RHOPRESSA) 0.02 % ophthalmic solution Place 1 drop into both eyes at bedtime       ofloxacin (OCUFLOX) 0.3 % ophthalmic solution INSTILL 1 DROP IN LEFT EYE FOUR TIMES DAILY STARTING 1 DAY BEFORE SURGERY AND. CONTINUE FOR 1 WEEK THEN STOP       raloxifene (EVISTA) 60 MG tablet Take 1 tablet (60 mg) by mouth daily. 90 tablet 3     red yeast rice 600 MG CAPS Take 600 mg by mouth daily       traZODone (DESYREL) 150 MG tablet Take 2-3  "tablets (300-450 mg) by mouth at bedtime. 90 tablet 1     traZODone (DESYREL) 150 MG tablet Take 2-3 tablets (300-450 mg) by mouth at bedtime 90 tablet 0     vitamin D3 (CHOLECALCIFEROL) 50 mcg (2000 units) tablet Take 1 tablet by mouth 2 times daily       Vitamin E 90 MG (200 UNIT) capsule Take 200 Units by mouth daily         Objective  /86 (BP Location: Left arm, Patient Position: Sitting, Cuff Size: Adult Large)   Pulse 101   Wt 78.9 kg (174 lb)   LMP  (LMP Unknown)   SpO2 96%   BMI 29.41 kg/m      General Appearance:    Alert, cooperative, no distress, appears stated age   Head:    Normocephalic, without obvious abnormality, atraumatic   Throat:   Lips, mucosa, and tongue normal; teeth and gums normal   Neck:   Supple, symmetrical, trachea midline, no adenopathy;        thyroid:  No enlargement/tenderness/nodules; no carotid    bruit or JVD   Back:     Symmetric, no curvature, ROM normal, no CVA tenderness   Lungs:     Clear to auscultation bilaterally, respirations unlabored   Chest wall:    No tenderness or deformity   Heart:    Regular rate and rhythm, S1 and S2 normal, no murmur, rub   or gallop   Abdomen:     Soft, non-tender, bowel sounds active all four quadrants,     no masses, no organomegaly   Extremities:   Normal, atraumatic, no cyanosis or edema   Pulses:   2+ and symmetric all extremities   Skin:   Skin color, texture, turgor normal, no rashes or lesions     Results    Lab Results personally reviewed   Lab Results   Component Value Date    CHOL 149 12/22/2023     Lab Results   Component Value Date    HDL 32 (L) 12/22/2023     No components found for: \"LDLCALC\"  Lab Results   Component Value Date    TRIG 239 (H) 12/22/2023    TRIG 133 08/07/2023     Lab Results   Component Value Date    WBC 8.3 10/01/2024    HGB 15.8 (H) 10/01/2024    HCT 44.5 10/01/2024     10/01/2024     Lab Results   Component Value Date    BUN 21.3 10/01/2024     10/01/2024    CO2 24 10/01/2024 "             Thank you for allowing me to participate in the care of your patient.      Sincerely,     EDDA QUINONEZ MD     [facility]  cc:   Addis Quinonez MD  [address], [address]  [place] [address]

## 2024-11-04 NOTE — PROGRESS NOTES
[facility]  Heart Care Clinic Follow-up Note    Assessment & Plan        (I48.0) Paroxysmal atrial fibrillation (H)  (primary encounter diagnosis)  Comment: Truly paroxysmal, most likely not valvular, and asymptomatic.  This is based on prior echo. Will continue Eliquis.  Her CHADS2 Vascore is 6 given her at 7.2 to 12.2% risk of a stroke a year.  Should she have recurrence would consider antiarrhythmic.  Continue metoprolol.  She is worried about getting off Eliquis, I will arrange for event monitor and if we do not see any recurrent atrial fibrillation then discuss further with her although sounds like she was not willing to accept the risk of a stroke..     (I10) Essential hypertension, benign  Comment: Under good control currently on metoprolol.  Okay with blood pressures running about 140 systolic.     (I26.99) Acute pulmonary embolism, unspecified pulmonary embolism type, unspecified whether acute cor pulmonale present (H)  Comment: So noted, felt to be due to right hip fracture and DVT that developed thereafter.  This might have been the instigator for the A-fib.      Plan  1.  2-week Zio patch monitor.  2.  Follow-up in 6 months.  3.  Hold Eliquis for upcoming eye surgery.  4.  If she really wants to discontinue Eliquis we will make decision together.    The longitudinal plan of care for the diagnosis(es)/condition(s) as documented were addressed during this visit. Due to the added complexity in care, I will continue to support Bharti in the subsequent management and with ongoing continuity of care.     Subjective  CC: 79-year-old white female being seen in follow-up.  Since I seen him her primary doctor is stated she does not need to take Eliquis for the pulmonary embolism.  Patient would like to stop Eliquis for the A-fib.  She did not have any significant chest pain, palpitations, syncope, dizziness or peripheral edema.  She is here with her daughter today.  She cannot tell me that she is  bleeding just does not like the idea of taking a blood thinner.    Medications  Current Outpatient Medications   Medication Sig Dispense Refill    acetaminophen (TYLENOL) 500 MG tablet Take 500-1,000 mg by mouth every 6 hours as needed for mild pain      brimonidine (ALPHAGAN) 0.2 % ophthalmic solution Place 1 drop into both eyes 3 times daily      calcium carbonate (OS-TAMIKO) 1500 (600 Ca) MG tablet Take 600 mg by mouth 2 times daily (with meals)      cholecalciferol (VITAMIN D3) 25 mcg (1000 units) capsule Take 1 capsule by mouth daily Evening      coenzyme Q-10 capsule Take 1 capsule by mouth daily  120 mg      ELIQUIS ANTICOAGULANT 5 MG tablet Take 1 tablet (5 mg) by mouth 2 times daily 180 tablet 1    fenofibrate (TRIGLIDE/LOFIBRA) 160 MG tablet Take 1 tablet (160 mg) by mouth daily 90 tablet 3    fish oil-omega-3 fatty acids 500 MG capsule Take 1 capsule by mouth 2 times daily      latanoprost (XALATAN) 0.005 % ophthalmic solution Place 1 drop into both eyes daily      levothyroxine (SYNTHROID/LEVOTHROID) 50 MCG tablet Take 1 tablet (50 mcg) by mouth daily 90 tablet 1    metoprolol succinate ER (TOPROL XL) 50 MG 24 hr tablet Take 1 tablet (50 mg) by mouth daily. 90 tablet 3    multivitamin w/minerals (THERA-VIT-M) tablet Take 1 tablet by mouth daily      netarsudil (RHOPRESSA) 0.02 % ophthalmic solution Place 1 drop into both eyes at bedtime      ofloxacin (OCUFLOX) 0.3 % ophthalmic solution INSTILL 1 DROP IN LEFT EYE FOUR TIMES DAILY STARTING 1 DAY BEFORE SURGERY AND. CONTINUE FOR 1 WEEK THEN STOP      raloxifene (EVISTA) 60 MG tablet Take 1 tablet (60 mg) by mouth daily. 90 tablet 3    red yeast rice 600 MG CAPS Take 600 mg by mouth daily      traZODone (DESYREL) 150 MG tablet Take 2-3 tablets (300-450 mg) by mouth at bedtime. 90 tablet 1    traZODone (DESYREL) 150 MG tablet Take 2-3 tablets (300-450 mg) by mouth at bedtime 90 tablet 0    vitamin D3 (CHOLECALCIFEROL) 50 mcg (2000 units) tablet Take 1 tablet by  "mouth 2 times daily      Vitamin E 90 MG (200 UNIT) capsule Take 200 Units by mouth daily         Objective  /86 (BP Location: Left arm, Patient Position: Sitting, Cuff Size: Adult Large)   Pulse 101   Wt 78.9 kg (174 lb)   LMP  (LMP Unknown)   SpO2 96%   BMI 29.41 kg/m      General Appearance:    Alert, cooperative, no distress, appears stated age   Head:    Normocephalic, without obvious abnormality, atraumatic   Throat:   Lips, mucosa, and tongue normal; teeth and gums normal   Neck:   Supple, symmetrical, trachea midline, no adenopathy;        thyroid:  No enlargement/tenderness/nodules; no carotid    bruit or JVD   Back:     Symmetric, no curvature, ROM normal, no CVA tenderness   Lungs:     Clear to auscultation bilaterally, respirations unlabored   Chest wall:    No tenderness or deformity   Heart:    Regular rate and rhythm, S1 and S2 normal, no murmur, rub   or gallop   Abdomen:     Soft, non-tender, bowel sounds active all four quadrants,     no masses, no organomegaly   Extremities:   Normal, atraumatic, no cyanosis or edema   Pulses:   2+ and symmetric all extremities   Skin:   Skin color, texture, turgor normal, no rashes or lesions     Results    Lab Results personally reviewed   Lab Results   Component Value Date    CHOL 149 12/22/2023     Lab Results   Component Value Date    HDL 32 (L) 12/22/2023     No components found for: \"LDLCALC\"  Lab Results   Component Value Date    TRIG 239 (H) 12/22/2023    TRIG 133 08/07/2023     Lab Results   Component Value Date    WBC 8.3 10/01/2024    HGB 15.8 (H) 10/01/2024    HCT 44.5 10/01/2024     10/01/2024     Lab Results   Component Value Date    BUN 21.3 10/01/2024     10/01/2024    CO2 24 10/01/2024         "

## 2024-11-29 ENCOUNTER — HOSPITAL ENCOUNTER (OUTPATIENT)
Dept: GENERAL RADIOLOGY | Facility: HOSPITAL | Age: 79
Discharge: HOME | End: 2024-11-29
Attending: FAMILY MEDICINE | Admitting: FAMILY MEDICINE
Payer: COMMERCIAL

## 2024-11-29 DIAGNOSIS — R05.1 ACUTE COUGH: ICD-10-CM

## 2024-11-29 PROCEDURE — 71046 X-RAY EXAM CHEST 2 VIEWS: CPT

## 2024-12-03 ENCOUNTER — OFFICE VISIT (OUTPATIENT)
Dept: FAMILY MEDICINE | Facility: CLINIC | Age: 79
End: 2024-12-03
Payer: COMMERCIAL

## 2024-12-03 VITALS
RESPIRATION RATE: 16 BRPM | BODY MASS INDEX: 28.32 KG/M2 | TEMPERATURE: 98.3 F | DIASTOLIC BLOOD PRESSURE: 80 MMHG | SYSTOLIC BLOOD PRESSURE: 134 MMHG | WEIGHT: 170 LBS | OXYGEN SATURATION: 97 % | HEIGHT: 65 IN | HEART RATE: 85 BPM

## 2024-12-03 DIAGNOSIS — H40.9 GLAUCOMA OF LEFT EYE, UNSPECIFIED GLAUCOMA TYPE: ICD-10-CM

## 2024-12-03 DIAGNOSIS — I48.0 PAROXYSMAL ATRIAL FIBRILLATION (H): ICD-10-CM

## 2024-12-03 DIAGNOSIS — Z01.818 PREOP GENERAL PHYSICAL EXAM: Primary | ICD-10-CM

## 2024-12-03 PROCEDURE — 90480 ADMN SARSCOV2 VAC 1/ONLY CMP: CPT | Performed by: FAMILY MEDICINE

## 2024-12-03 PROCEDURE — 91320 SARSCV2 VAC 30MCG TRS-SUC IM: CPT | Performed by: FAMILY MEDICINE

## 2024-12-03 PROCEDURE — 99214 OFFICE O/P EST MOD 30 MIN: CPT | Mod: 25 | Performed by: FAMILY MEDICINE

## 2024-12-03 NOTE — PATIENT INSTRUCTIONS
How to Take Your Medication Before Surgery  Preoperative Medication Instructions   Antiplatelet or Anticoagulation Medication Instructions   - Patient is on no antiplatelet or anticoagulation medications.    Additional Medication Instructions   - Beta Blockers: Continue taking on the day of surgery.   - fenofibrate, niacin, non-statin lipid meds: DO NOT TAKE on day of surgery.   - Herbal medications and vitamins: DO NOT TAKE 7 days prior to surgery.     Also OK to hold eliquis while doing heart monitor.     Patient Education   Preparing for Your Surgery  For Adults  Getting started  In most cases, a nurse will call to review your health history and instructions. They will give you an arrival time based on your scheduled surgery time. Please be ready to share:  Your doctor's clinic name and phone number  Your medical, surgical, and anesthesia history  A list of allergies and sensitivities  A list of medicines, including herbal treatments and over-the-counter drugs  Whether the patient has a legal guardian (ask how to send us the papers in advance)  Note: You may not receive a call if you were seen at our PAC (Preoperative Assessment Center).  Please tell us if you're pregnant--or if there's any chance you might be pregnant. Some surgeries may injure a fetus (unborn baby), so they require a pregnancy test. Surgeries that are safe for a fetus don't always need a test, and you can choose whether to have one.   Preparing for surgery  Within 10 to 30 days of surgery: Have a pre-op exam (sometimes called an H&P, or History and Physical). This can be done at a clinic or pre-operative center.  If you're having a , you may not need this exam. Talk to your care team.  At your pre-op exam, talk to your care team about all medicines you take. (This includes CBD oil and any drugs, such as THC, marijuana, and other forms of cannabis.) If you need to stop any medicine before surgery, ask when to start taking it  again.  This is for your safety. Many medicines and drugs can make you bleed too much during surgery. Some change how well surgery (anesthesia) drugs work.  Call your insurance company to let them know you're having surgery. (If you don't have insurance, call [phone].)  Call your clinic if there's any change in your health. This includes a scrape or scratch near the surgery site, or any signs of a cold (sore throat, runny nose, cough, rash, fever).  Eating and drinking guidelines  For your safety: Unless your surgeon tells you otherwise, follow the guidelines below.  Eat and drink as normal until 8 hours before you arrive for surgery. After that, no food or milk. You can spit out gum when you arrive.  Drink clear liquids until 2 hours before you arrive. These are liquids you can see through, like water, Gatorade, and Propel Water. They also include plain black coffee and tea (no cream or milk).  No alcohol for 24 hours before you arrive. The night before surgery, stop any drinks that contain THC.  If your care team tells you to take medicine on the morning of surgery, it's okay to take it with a sip of water. No other medicines or drugs are allowed (including CBD oil)--follow your care team's instructions.  If you have questions the day of surgery, call your hospital or surgery center.   Preventing infection  Shower or bathe the night before and the morning of surgery. Follow the instructions your clinic gave you. (If no instructions, use regular soap.)  Don't shave or clip hair near your surgery site. We'll remove the hair if needed.  Don't smoke or vape the morning of surgery. No chewing tobacco for 6 hours before you arrive. A nicotine patch is okay. You may spit out nicotine gum when you arrive.  For some surgeries, the surgeon will tell you to fully quit smoking and nicotine.  We will make every effort to keep you safe from infection. We will:  Clean our hands often with soap and water (or an alcohol-based  hand rub).  Clean the skin at your surgery site with a special soap that kills germs.  Give you a special gown to keep you warm. (Cold raises the risk of infection.)  Wear hair covers, masks, gowns, and gloves during surgery.  Give antibiotic medicine, if prescribed. Not all surgeries need this medicine.  What to bring on the day of surgery  Photo ID and insurance card  Copy of your health care directive, if you have one  Glasses and hearing aids (bring cases)  You can't wear contacts during surgery  Inhaler and eye drops, if you use them (tell us about these when you arrive)  CPAP machine or breathing device, if you use them  A few personal items, if spending the night  If you have . . .  A pacemaker, ICD (cardiac defibrillator), or other implant: Bring the ID card.  An implanted stimulator: Bring the remote control.  A legal guardian: Bring a copy of the certified (court-stamped) guardianship papers.  Please remove any jewelry, including body piercings. Leave jewelry and other valuables at home.  If you're going home the day of surgery  You must have a responsible adult drive you home. They should stay with you overnight as well.  If you don't have someone to stay with you, and you aren't safe to go home alone, we may keep you overnight. Insurance often won't pay for this.  After surgery  If it's hard to control your pain or you need more pain medicine, please call your surgeon's office.  Questions?   If you have any questions for your care team, list them here:   ____________________________________________________________________________________________________________________________________________________________________________________________________________________________________________________________  For informational purposes only. Not to replace the advice of your health care provider. Copyright   2003, 2019 [facility]. All rights reserved. Clinically reviewed by Rogelio Dacosta MD.  [employer] [id number] - REV 08/24.

## 2024-12-03 NOTE — PROGRESS NOTES
Preoperative Evaluation  [address] [address]  Phone: [phone]  Fax: [phone]  Primary Provider: CATARINA ZAVALA DO  Pre-op Performing Provider: CATARINA ZAVALA DO  Dec 3, 2024             12/3/2024   Surgical Information   What procedure is being done? eye stent    Facility or Hospital where procedure/surgery will be performed: [facility]    Who is doing the procedure / surgery? dr hopson    Date of surgery / procedure: dec 12    Time of surgery / procedure: 220    Where do you plan to recover after surgery? at home alone        Patient-reported     Fax number for surgical facility: [phone]    Assessment & Plan     The proposed surgical procedure is considered LOW risk.    Preop general physical exam  Generally low risk procedure. Due to nature of atrial fibrillation, will be holding while on holter monitor. She will continue her beta blocker the day of.     Glaucoma of left eye, unspecified glaucoma type  Chronic, not well controlled. Agree with procedure recommendation.    Paroxysmal atrial fibrillation (H)  Chronic, likely stable. Appears to be in regular rhythm today. Agree with cardiology regarding hold.               - No identified additional risk factors other than previously addressed    Preoperative Medication Instructions  Antiplatelet or Anticoagulation Medication Instructions   - Patient is on no antiplatelet or anticoagulation medications.    Additional Medication Instructions   - Beta Blockers: Continue taking on the day of surgery.   - fenofibrate, niacin, non-statin lipid meds: DO NOT TAKE on day of surgery.   - Herbal medications and vitamins: DO NOT TAKE 7 days prior to surgery.    Recommendation  Approval given to proceed with proposed procedure, without further diagnostic evaluation.    Ubaldo Hay is a 79 year old, presenting for the following:  Pre-Op Exam          12/3/2024     11:52 AM   Additional Questions   Roomed by Fran Na   Accompanied by N/A     HPI related to upcoming procedure:     Eye stent placement  -This is due to patient's glaucoma, been on eye drops for many years.   -Patient did notice glaucoma was getting worse, leading ophthalmologist to suggest stent.     DASI  50.2 points  The higher the score (maximum 58.2), the higher the functional status.  8.91 METs          12/3/2024   Pre-Op Questionnaire   Have you ever had a heart attack or stroke? No    Have you ever had surgery on your heart or blood vessels, such as a stent placement, a coronary artery bypass, or surgery on an artery in your head, neck, heart, or legs? No    Do you have chest pain with activity? No    Do you have a history of heart failure? No    Do you currently have a cold, bronchitis or symptoms of other infection? (!) YES - symptoms resolved     Do you have a cough, shortness of breath, or wheezing? (!) YES minor lingering    Do you or anyone in your family have previous history of blood clots? (!) YES - was induced    Do you or does anyone in your family have a serious bleeding problem such as prolonged bleeding following surgeries or cuts? No    Have you ever had problems with anemia or been told to take iron pills? No    Have you had any abnormal blood loss such as black, tarry or bloody stools, or abnormal vaginal bleeding? No    Have you ever had a blood transfusion? No    Are you willing to have a blood transfusion if it is medically needed before, during, or after your surgery? Yes    Have you or any of your relatives ever had problems with anesthesia? (!) UNKNOWN - patient has had general anesthesia with no severe reaction, just nausea    Do you have sleep apnea, excessive snoring or daytime drowsiness? No    Do you have any artifical heart valves or other implanted medical devices like a pacemaker, defibrillator, or continuous glucose monitor? No    Do you have artificial joints? No    Are you  allergic to latex? (!) YES        Patient-reported     Health Care Directive  Patient does not have a Health Care Directive: Discussed advance care planning with patient; information given to patient to review.    Preoperative Review of    reviewed - no record of controlled substances prescribed.      Status of Chronic Conditions:  HYPERTENSION - Patient has longstanding history of HTN , currently denies any symptoms referable to elevated blood pressure. Specifically denies chest pain, palpitations, dyspnea, orthopnea, PND or peripheral edema. Blood pressure readings have been in normal range. Current medication regimen is as listed below. Patient denies any side effects of medication.     Patient Active Problem List    Diagnosis Date Noted    Paroxysmal atrial fibrillation (H) 04/08/2024     Priority: Medium    Essential hypertension, benign 04/08/2024     Priority: Medium    Pulmonary embolism (H) 12/22/2023     Priority: Medium      Past Medical History:   Diagnosis Date    Heart disease July 2023    Hypertension     Thyroid disease      Past Surgical History:   Procedure Laterality Date    COLONOSCOPY      ORTHOPEDIC SURGERY       Current Outpatient Medications   Medication Sig Dispense Refill    acetaminophen (TYLENOL) 500 MG tablet Take 500-1,000 mg by mouth every 6 hours as needed for mild pain      benzonatate (TESSALON) 200 MG capsule Take 1 capsule (200 mg) by mouth 3 times daily as needed for cough. 30 capsule 0    brimonidine (ALPHAGAN) 0.2 % ophthalmic solution Place 1 drop into both eyes 3 times daily      calcium carbonate (OS-TAMIKO) 1500 (600 Ca) MG tablet Take 600 mg by mouth 2 times daily (with meals)      cholecalciferol (VITAMIN D3) 25 mcg (1000 units) capsule Take 1 capsule by mouth daily Evening      coenzyme Q-10 capsule Take 1 capsule by mouth daily  120 mg      ELIQUIS ANTICOAGULANT 5 MG tablet Take 1 tablet (5 mg) by mouth 2 times daily 180 tablet 1    fenofibrate (TRIGLIDE/LOFIBRA) 160  MG tablet Take 1 tablet (160 mg) by mouth daily 90 tablet 3    fish oil-omega-3 fatty acids 500 MG capsule Take 1 capsule by mouth 2 times daily      latanoprost (XALATAN) 0.005 % ophthalmic solution Place 1 drop into both eyes daily      levothyroxine (SYNTHROID/LEVOTHROID) 50 MCG tablet Take 1 tablet (50 mcg) by mouth daily 90 tablet 1    metoprolol succinate ER (TOPROL XL) 50 MG 24 hr tablet Take 1 tablet (50 mg) by mouth daily. 90 tablet 3    multivitamin w/minerals (THERA-VIT-M) tablet Take 1 tablet by mouth daily      netarsudil (RHOPRESSA) 0.02 % ophthalmic solution Place 1 drop into both eyes at bedtime      raloxifene (EVISTA) 60 MG tablet Take 1 tablet (60 mg) by mouth daily. 90 tablet 3    red yeast rice 600 MG CAPS Take 600 mg by mouth daily      traZODone (DESYREL) 150 MG tablet Take 2-3 tablets (300-450 mg) by mouth at bedtime. 90 tablet 1    traZODone (DESYREL) 150 MG tablet Take 2-3 tablets (300-450 mg) by mouth at bedtime 90 tablet 0    vitamin D3 (CHOLECALCIFEROL) 50 mcg (2000 units) tablet Take 1 tablet by mouth 2 times daily      Vitamin E 90 MG (200 UNIT) capsule Take 200 Units by mouth daily      ofloxacin (OCUFLOX) 0.3 % ophthalmic solution INSTILL 1 DROP IN LEFT EYE FOUR TIMES DAILY STARTING 1 DAY BEFORE SURGERY AND. CONTINUE FOR 1 WEEK THEN STOP (Patient not taking: Reported on 12/3/2024)         Allergies   Allergen Reactions    Iodinated Contrast Media Hives    Iodine Hives    Latex Hives    Temazepam Unknown        Social History     Tobacco Use    Smoking status: Never     Passive exposure: Past    Smokeless tobacco: Never   Substance Use Topics    Alcohol use: Not Currently     Family History   Problem Relation Age of Onset    Hypertension Brother      History   Drug Use Unknown             Review of Systems  Constitutional, neuro, ENT, endocrine, pulmonary, cardiac, gastrointestinal, genitourinary, musculoskeletal, integument and psychiatric systems are negative, except as otherwise  "noted.    Objective    /80   Pulse 85   Temp 98.3  F (36.8  C) (Oral)   Resp 16   Ht 1.638 m (5' 4.5\")   Wt 77.1 kg (170 lb)   LMP  (LMP Unknown)   SpO2 97%   BMI 28.73 kg/m     Estimated body mass index is 28.73 kg/m  as calculated from the following:    Height as of this encounter: 1.638 m (5' 4.5\").    Weight as of this encounter: 77.1 kg (170 lb).  Physical Exam  GENERAL: alert and no distress  EYES: Eyes grossly normal to inspection, PERRL and conjunctivae and sclerae normal  HENT: ear canals and TM's normal, nose and mouth without ulcers or lesions  NECK: no adenopathy, no asymmetry, masses, or scars  RESP: lungs clear to auscultation - no rales, rhonchi or wheezes  CV: regular rate and rhythm, normal S1 S2, no S3 or S4, no murmur, click or rub, no peripheral edema  ABDOMEN: soft, nontender, no hepatosplenomegaly, no masses and bowel sounds normal  MS: no gross musculoskeletal defects noted, no edema  SKIN: no suspicious lesions or rashes  NEURO: Normal strength and tone, mentation intact and speech normal  PSYCH: mentation appears normal, affect normal/bright    Recent Labs   Lab Test 10/01/24  1606 04/30/24  1517 12/22/23  1410   HGB 15.8*  --  14.8     --  363     --  137   POTASSIUM 4.1  --  4.3   CR 0.88 0.9 0.64        Diagnostics  No labs were ordered during this visit.   No EKG required for low risk surgery (cataract, skin procedure, breast biopsy, etc).    Revised Cardiac Risk Index (RCRI)  The patient has the following serious cardiovascular risks for perioperative complications:   - No serious cardiac risks = 0 points     RCRI Interpretation: 0 points: Class I (very low risk - 0.4% complication rate)         Signed Electronically by: CATARINA ZAVALA DO  A copy of this evaluation report is provided to the requesting physician.         "

## 2024-12-19 ENCOUNTER — OFFICE VISIT (OUTPATIENT)
Dept: GASTROENTEROLOGY | Facility: CLINIC | Age: 79
End: 2024-12-19
Attending: PHYSICIAN ASSISTANT
Payer: COMMERCIAL

## 2024-12-19 VITALS
WEIGHT: 172.1 LBS | OXYGEN SATURATION: 99 % | SYSTOLIC BLOOD PRESSURE: 144 MMHG | BODY MASS INDEX: 28.67 KG/M2 | HEART RATE: 93 BPM | DIASTOLIC BLOOD PRESSURE: 66 MMHG | HEIGHT: 65 IN

## 2024-12-19 DIAGNOSIS — K86.2 PANCREATIC CYST: ICD-10-CM

## 2024-12-19 ASSESSMENT — PAIN SCALES - GENERAL: PAINLEVEL_OUTOF10: NO PAIN (0)

## 2024-12-19 NOTE — LETTER
12/19/2024      Bharti Peña  [address]  [facility] [address]      Dear Colleague,    Thank you for referring your patient, Bharti Peña, to the [facility] [place]. Please see a copy of my visit note below.    GI Clinic Visit    CC/REFERRING MD:  Nicki Hill  REASON FOR CONSULTATION:   Bharti is a 79 year old female who I was asked to see in consultation at the request of Dr. 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 She was referred to pancrease GI clinic for an incedintal finding of pancreatic cyst noted on CT urogram done for microscopic hematuria in urology clinic.   The pancreatic cyst is at the posterior aspect of the uncinate process 1.7 x 0.8 cm. No masses noted. May represent a side branch intraductal papillary mucinous neoplasm (IPMN)  No symptoms. No biliary obstruction or pancreatitis or     Impression: Incidental finding of pancreatic cyst at the posterior aspect of the uncinate process 1.7 x 0.8 cm without high risk stigmata or worrisome features.     Discussed with the patient about pros and cons of surveillance for pancreatic cyst. There is a potential transformation to malignancy after 10 years and at the patient's age it is unlikely that she will fit for a pancreatic cancer surgery and hence there is limited value of regular imaging for pancreatic cyst surveillance.       Plan:   No further surveillance is warranted at this time  The patient was educated about pancreatic cyst management. She understands the plan and agrees on it.   Educated that if she develops diabetes with significant weight loss or abdominal pain, these might be warning signs of cyst progression and might consider further testing at that point if feasible.  There is no further work  up needed from GI stand point at this time      RTC As needed      Zi Henderson MD  Resident  Gastroenterology, Hepatology and Nutrition  [facility]    ---------------------------------------------------------------------------------------------------  HPI:  79 year old female with known medical history of:  Paroxysmal atrial fibrillation, DVT/Pulmonary embolism around June 2023 and since then on Eliquis and currently underwent heart monitor and possibly to stop the Eliquis if no episodes of Afib are found but she is still waiting for the follow up.   Also takes metoprolol 50 mg daily  Hypothyroidism on levothyroxine 50 mcg daily  Essential hypertension  No history of DM. No history of stroke.     No pains, no weight loss. No jaundice, no changes of the color or the urine  She had constipation before and now resolved without laxatives.   No blood in the stool. No vomiting. No fever or chills.   No leg swelling.     Had eye surgery recently.   Hip replacement 2023  Completed colonoscopy screening and last was around 10 years ago and she was told it was normal.   Her daughter was diagnosed with colon cancer at the age of 44.    She was seen in urology clinic for assessment of microscopic hematuria noted after having UA for presentation with back/flank pain.     CT urogram with and without contrast 4/30/2024 report:  Diffuse circumferential wall thickening or bladder with mild surrounding fat stranding suggestive of cystitis. Otherwise, no CT evidence for source of hematuria.  2.  Small cyst in the posterior aspect of the uncinate process of the pancreas measures 1.7 x 0.8 cm. This may represent a side branch intraductal papillary mucinous neoplasm (IPMN). Follow-up guidelines as below.  3.  Abnormal thickening of the postmenopausal endometrium. Gynecology consultation and possible tissue sampling are recommended.      Current Outpatient Medications   Medication Sig Dispense Refill     acetaminophen  (TYLENOL) 500 MG tablet Take 500-1,000 mg by mouth every 6 hours as needed for mild pain       benzonatate (TESSALON) 200 MG capsule Take 1 capsule (200 mg) by mouth 3 times daily as needed for cough. 30 capsule 0     brimonidine (ALPHAGAN) 0.2 % ophthalmic solution Place 1 drop into both eyes 3 times daily       calcium carbonate (OS-TAMIKO) 1500 (600 Ca) MG tablet Take 600 mg by mouth 2 times daily (with meals)       cholecalciferol (VITAMIN D3) 25 mcg (1000 units) capsule Take 1 capsule by mouth daily Evening       coenzyme Q-10 capsule Take 1 capsule by mouth daily  120 mg       ELIQUIS ANTICOAGULANT 5 MG tablet Take 1 tablet (5 mg) by mouth 2 times daily 180 tablet 1     fenofibrate (TRIGLIDE/LOFIBRA) 160 MG tablet Take 1 tablet (160 mg) by mouth daily 90 tablet 3     fish oil-omega-3 fatty acids 500 MG capsule Take 1 capsule by mouth 2 times daily       latanoprost (XALATAN) 0.005 % ophthalmic solution Place 1 drop into both eyes daily       levothyroxine (SYNTHROID/LEVOTHROID) 50 MCG tablet Take 1 tablet (50 mcg) by mouth daily 90 tablet 1     metoprolol succinate ER (TOPROL XL) 50 MG 24 hr tablet Take 1 tablet (50 mg) by mouth daily. 90 tablet 3     multivitamin w/minerals (THERA-VIT-M) tablet Take 1 tablet by mouth daily       netarsudil (RHOPRESSA) 0.02 % ophthalmic solution Place 1 drop into both eyes at bedtime       ofloxacin (OCUFLOX) 0.3 % ophthalmic solution        raloxifene (EVISTA) 60 MG tablet Take 1 tablet (60 mg) by mouth daily. 90 tablet 3     red yeast rice 600 MG CAPS Take 600 mg by mouth daily       traZODone (DESYREL) 150 MG tablet Take 2-3 tablets (300-450 mg) by mouth at bedtime. 90 tablet 1     traZODone (DESYREL) 150 MG tablet Take 2-3 tablets (300-450 mg) by mouth at bedtime 90 tablet 0     vitamin D3 (CHOLECALCIFEROL) 50 mcg (2000 units) tablet Take 1 tablet by mouth 2 times daily       Vitamin E 90 MG (200 UNIT) capsule Take 200 Units by mouth daily       No current  facility-administered medications for this visit.             ROS:    Non other than mentioned in the HPI    PROBLEM LIST  Patient Active Problem List    Diagnosis Date Noted     Paroxysmal atrial fibrillation (H) 04/08/2024     Priority: Medium     Essential hypertension, benign 04/08/2024     Priority: Medium     Pulmonary embolism (H) 12/22/2023     Priority: Medium       PERTINENT PAST MEDICAL HISTORY:  Past Medical History:   Diagnosis Date     Heart disease July 2023     Hypertension      Thyroid disease        PREVIOUS SURGERIES:  Past Surgical History:   Procedure Laterality Date     COLONOSCOPY       ORTHOPEDIC SURGERY         PREVIOUS ENDOSCOPY:  Colonoscopies in outside facilities, no reports available    ALLERGIES:     Allergies   Allergen Reactions     Iodinated Contrast Media Hives     Iodine Hives     Latex Hives     Temazepam Unknown       PERTINENT MEDICATIONS:  Current Outpatient Medications   Medication Sig Dispense Refill     acetaminophen (TYLENOL) 500 MG tablet Take 500-1,000 mg by mouth every 6 hours as needed for mild pain       benzonatate (TESSALON) 200 MG capsule Take 1 capsule (200 mg) by mouth 3 times daily as needed for cough. 30 capsule 0     brimonidine (ALPHAGAN) 0.2 % ophthalmic solution Place 1 drop into both eyes 3 times daily       calcium carbonate (OS-TAMIKO) 1500 (600 Ca) MG tablet Take 600 mg by mouth 2 times daily (with meals)       cholecalciferol (VITAMIN D3) 25 mcg (1000 units) capsule Take 1 capsule by mouth daily Evening       coenzyme Q-10 capsule Take 1 capsule by mouth daily  120 mg       ELIQUIS ANTICOAGULANT 5 MG tablet Take 1 tablet (5 mg) by mouth 2 times daily 180 tablet 1     fenofibrate (TRIGLIDE/LOFIBRA) 160 MG tablet Take 1 tablet (160 mg) by mouth daily 90 tablet 3     fish oil-omega-3 fatty acids 500 MG capsule Take 1 capsule by mouth 2 times daily       latanoprost (XALATAN) 0.005 % ophthalmic solution Place 1 drop into both eyes daily       levothyroxine  (SYNTHROID/LEVOTHROID) 50 MCG tablet Take 1 tablet (50 mcg) by mouth daily 90 tablet 1     metoprolol succinate ER (TOPROL XL) 50 MG 24 hr tablet Take 1 tablet (50 mg) by mouth daily. 90 tablet 3     multivitamin w/minerals (THERA-VIT-M) tablet Take 1 tablet by mouth daily       netarsudil (RHOPRESSA) 0.02 % ophthalmic solution Place 1 drop into both eyes at bedtime       ofloxacin (OCUFLOX) 0.3 % ophthalmic solution        raloxifene (EVISTA) 60 MG tablet Take 1 tablet (60 mg) by mouth daily. 90 tablet 3     red yeast rice 600 MG CAPS Take 600 mg by mouth daily       traZODone (DESYREL) 150 MG tablet Take 2-3 tablets (300-450 mg) by mouth at bedtime. 90 tablet 1     traZODone (DESYREL) 150 MG tablet Take 2-3 tablets (300-450 mg) by mouth at bedtime 90 tablet 0     vitamin D3 (CHOLECALCIFEROL) 50 mcg (2000 units) tablet Take 1 tablet by mouth 2 times daily       Vitamin E 90 MG (200 UNIT) capsule Take 200 Units by mouth daily       No current facility-administered medications for this visit.       SOCIAL HISTORY:  Social History     Socioeconomic History     Marital status:      Spouse name: Not on file     Number of children: Not on file     Years of education: Not on file     Highest education level: Not on file   Occupational History     Not on file   Tobacco Use     Smoking status: Never     Passive exposure: Past     Smokeless tobacco: Never   Vaping Use     Vaping status: Never Used   Substance and Sexual Activity     Alcohol use: Not Currently     Drug use: Not Currently     Sexual activity: Not Currently     Birth control/protection: Post-menopausal   Other Topics Concern     Parent/sibling w/ CABG, MI or angioplasty before 65F 55M? No   Social History Narrative     Not on file     Social Drivers of Health     Financial Resource Strain: Low Risk  (9/27/2024)    Financial Resource Strain      Within the past 12 months, have you or your family members you live with been unable to get utilities (heat,  "electricity) when it was really needed?: No   Food Insecurity: Low Risk  (9/27/2024)    Food Insecurity      Within the past 12 months, did you worry that your food would run out before you got money to buy more?: No      Within the past 12 months, did the food you bought just not last and you didn t have money to get more?: No   Transportation Needs: Low Risk  (9/27/2024)    Transportation Needs      Within the past 12 months, has lack of transportation kept you from medical appointments, getting your medicines, non-medical meetings or appointments, work, or from getting things that you need?: No   Physical Activity: Unknown (9/27/2024)    Exercise Vital Sign      Days of Exercise per Week: 0 days      Minutes of Exercise per Session: Not on file   Stress: No Stress Concern Present (9/27/2024)    [place] [employer] - Occupational Stress Questionnaire      Feeling of Stress : Only a little   Social Connections: Unknown (9/27/2024)    Social Connection and Isolation Panel [NHANES]      Frequency of Communication with Friends and Family: Not on file      Frequency of Social Gatherings with Friends and Family: Twice a week      Attends Yazdanism Services: Not on file      Active Member of Clubs or Organizations: Not on file      Attends Club or Organization Meetings: Not on file      Marital Status: Not on file   Interpersonal Safety: Not on file   Housing Stability: Low Risk  (9/27/2024)    Housing Stability      Do you have housing? : Yes      Are you worried about losing your housing?: No       FAMILY HISTORY:  Family History   Problem Relation Age of Onset     Hypertension Brother        Past/family/social history reviewed and no changes    PHYSICAL EXAMINATION:  Vitals BP (!) 144/66   Pulse 93   Ht 1.638 m (5' 4.5\")   Wt 78.1 kg (172 lb 1.6 oz)   LMP  (LMP Unknown)   SpO2 99%   BMI 29.08 kg/m     Wt   Wt Readings from Last 2 Encounters:   12/19/24 78.1 kg (172 lb 1.6 oz)   12/03/24 77.1 " kg (170 lb)      Gen: Pt sitting up on exam table in NAD, interactive and cooperative on exam  HEENT: normocephalic, atraumatic, no scleral icterus  Neck: supple, no LAD, no thyromegaly  CV: regular rate and rhythm and normal S1 S2  Resp: clear to ausculation bilaterally, normal respiratory effort  Abd: bowel sounds presents, soft. non-tender, non-distended, no masses or hepatosplenomegaly  Ext: No LLE  Skin: warm and dry, no rashes or ecchymoses  Psych: normal mood, normal affect, alert and oriented x3      PERTINENT STUDIES:    Office Visit on 10/01/2024   Component Date Value Ref Range Status     TSH 10/01/2024 2.97  0.30 - 4.20 uIU/mL Final     WBC Count 10/01/2024 8.3  4.0 - 11.0 10e3/uL Final     RBC Count 10/01/2024 4.58  3.80 - 5.20 10e6/uL Final     Hemoglobin 10/01/2024 15.8 (H)  11.7 - 15.7 g/dL Final     Hematocrit 10/01/2024 44.5  35.0 - 47.0 % Final     MCV 10/01/2024 97  78 - 100 fL Final     MCH 10/01/2024 34.5 (H)  26.5 - 33.0 pg Final     MCHC 10/01/2024 35.5  31.5 - 36.5 g/dL Final     RDW 10/01/2024 13.0  10.0 - 15.0 % Final     Platelet Count 10/01/2024 311  150 - 450 10e3/uL Final     Sodium 10/01/2024 135  135 - 145 mmol/L Final     Potassium 10/01/2024 4.1  3.4 - 5.3 mmol/L Final     Chloride 10/01/2024 99  98 - 107 mmol/L Final     Carbon Dioxide (CO2) 10/01/2024 24  22 - 29 mmol/L Final     Anion Gap 10/01/2024 12  7 - 15 mmol/L Final     Urea Nitrogen 10/01/2024 21.3  8.0 - 23.0 mg/dL Final     Creatinine 10/01/2024 0.88  0.51 - 0.95 mg/dL Final     GFR Estimate 10/01/2024 66  >60 mL/min/1.73m2 Final     Calcium 10/01/2024 10.4  8.8 - 10.4 mg/dL Final     Glucose 10/01/2024 96  70 - 99 mg/dL Final           Attestation signed by Guru Owen Noland MD at 12/19/2024  5:57 PM (Updated):  --------------------- RESIDENT/FELLOW Attestation Statements --------------------------------------------------------    I performed a history and physical examination of the above patient  and discussed the management with Dr. Henderson on 12/19/2024. I reviewed the note and there are no changes to the past medical, family or social history.  A complete 10 point review of systems was obtained. Please see the HPI for pertinent positives and negatives. All other systems were reviewed and were found to be negative. I agree with the documented findings and plan of care as outlined with the following addition    - Given her age and comorbidities will recommend no further pancreas cyst surveillance  - Reviewed natural course of cyst with patient including unintentional weight loss and new onset diabetes which should prompt further imaging evaluation    45  minutes spent on the date of the encounter doing chart review, review of outside records, review of test results, interpretation of tests, patient visit, documentation and discussion with other provider(s)    Dr Guru Noland  Associate Professor of Medicine  Gastroenterology, Pancreas-Biliary diseases  [phone]   Dr Guru Noland  Associate Professor of Medicine  Gastroenterology, Pancreas-Biliary diseases  [phone]       Again, thank you for allowing me to participate in the care of your patient.        Sincerely,        Guru Ludin MD

## 2024-12-19 NOTE — PROGRESS NOTES
GI Clinic Visit    CC/REFERRING MD:  Nicki Hill  REASON FOR CONSULTATION:   Bharti is a 79 year old female who I was asked to see in consultation at the request of Dr. 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 She was referred to pancrease GI clinic for an incedintal finding of pancreatic cyst noted on CT urogram done for microscopic hematuria in urology clinic.   The pancreatic cyst is at the posterior aspect of the uncinate process 1.7 x 0.8 cm. No masses noted. May represent a side branch intraductal papillary mucinous neoplasm (IPMN)  No symptoms. No biliary obstruction or pancreatitis or     Impression: Incidental finding of pancreatic cyst at the posterior aspect of the uncinate process 1.7 x 0.8 cm without high risk stigmata or worrisome features.     Discussed with the patient about pros and cons of surveillance for pancreatic cyst. There is a potential transformation to malignancy after 10 years and at the patient's age it is unlikely that she will fit for a pancreatic cancer surgery and hence there is limited value of regular imaging for pancreatic cyst surveillance.       Plan:   No further surveillance is warranted at this time  The patient was educated about pancreatic cyst management. She understands the plan and agrees on it.   Educated that if she develops diabetes with significant weight loss or abdominal pain, these might be warning signs of cyst progression and might consider further testing at that point if feasible.  There is no further work up needed from GI stand point at this time      RTC As needed      Zi Henderson MD  Resident  Gastroenterology, Hepatology and Nutrition  [facility]  [place]    ---------------------------------------------------------------------------------------------------  HPI:  79 year old female with known medical history of:  Paroxysmal atrial fibrillation, DVT/Pulmonary embolism around June 2023 and since then on Eliquis and currently underwent heart monitor and possibly to stop the Eliquis if no episodes of Afib are found but she is still waiting for the follow up.   Also takes metoprolol 50 mg daily  Hypothyroidism on levothyroxine 50 mcg daily  Essential hypertension  No history of DM. No history of stroke.     No pains, no weight loss. No jaundice, no changes of the color or the urine  She had constipation before and now resolved without laxatives.   No blood in the stool. No vomiting. No fever or chills.   No leg swelling.     Had eye surgery recently.   Hip replacement 2023  Completed colonoscopy screening and last was around 10 years ago and she was told it was normal.   Her daughter was diagnosed with colon cancer at the age of 44.    She was seen in urology clinic for assessment of microscopic hematuria noted after having UA for presentation with back/flank pain.     CT urogram with and without contrast 4/30/2024 report:  Diffuse circumferential wall thickening or bladder with mild surrounding fat stranding suggestive of cystitis. Otherwise, no CT evidence for source of hematuria.  2.  Small cyst in the posterior aspect of the uncinate process of the pancreas measures 1.7 x 0.8 cm. This may represent a side branch intraductal papillary mucinous neoplasm (IPMN). Follow-up guidelines as below.  3.  Abnormal thickening of the postmenopausal endometrium. Gynecology consultation and possible tissue sampling are recommended.      Current Outpatient Medications   Medication Sig Dispense Refill    acetaminophen (TYLENOL) 500 MG tablet Take 500-1,000 mg by mouth every 6 hours as needed for mild pain      benzonatate (TESSALON) 200 MG capsule Take 1 capsule (200 mg) by  mouth 3 times daily as needed for cough. 30 capsule 0    brimonidine (ALPHAGAN) 0.2 % ophthalmic solution Place 1 drop into both eyes 3 times daily      calcium carbonate (OS-TAMIKO) 1500 (600 Ca) MG tablet Take 600 mg by mouth 2 times daily (with meals)      cholecalciferol (VITAMIN D3) 25 mcg (1000 units) capsule Take 1 capsule by mouth daily Evening      coenzyme Q-10 capsule Take 1 capsule by mouth daily  120 mg      ELIQUIS ANTICOAGULANT 5 MG tablet Take 1 tablet (5 mg) by mouth 2 times daily 180 tablet 1    fenofibrate (TRIGLIDE/LOFIBRA) 160 MG tablet Take 1 tablet (160 mg) by mouth daily 90 tablet 3    fish oil-omega-3 fatty acids 500 MG capsule Take 1 capsule by mouth 2 times daily      latanoprost (XALATAN) 0.005 % ophthalmic solution Place 1 drop into both eyes daily      levothyroxine (SYNTHROID/LEVOTHROID) 50 MCG tablet Take 1 tablet (50 mcg) by mouth daily 90 tablet 1    metoprolol succinate ER (TOPROL XL) 50 MG 24 hr tablet Take 1 tablet (50 mg) by mouth daily. 90 tablet 3    multivitamin w/minerals (THERA-VIT-M) tablet Take 1 tablet by mouth daily      netarsudil (RHOPRESSA) 0.02 % ophthalmic solution Place 1 drop into both eyes at bedtime      ofloxacin (OCUFLOX) 0.3 % ophthalmic solution       raloxifene (EVISTA) 60 MG tablet Take 1 tablet (60 mg) by mouth daily. 90 tablet 3    red yeast rice 600 MG CAPS Take 600 mg by mouth daily      traZODone (DESYREL) 150 MG tablet Take 2-3 tablets (300-450 mg) by mouth at bedtime. 90 tablet 1    traZODone (DESYREL) 150 MG tablet Take 2-3 tablets (300-450 mg) by mouth at bedtime 90 tablet 0    vitamin D3 (CHOLECALCIFEROL) 50 mcg (2000 units) tablet Take 1 tablet by mouth 2 times daily      Vitamin E 90 MG (200 UNIT) capsule Take 200 Units by mouth daily       No current facility-administered medications for this visit.             ROS:    Non other than mentioned in the HPI    PROBLEM LIST  Patient Active Problem List    Diagnosis Date Noted    Paroxysmal atrial  fibrillation (H) 04/08/2024     Priority: Medium    Essential hypertension, benign 04/08/2024     Priority: Medium    Pulmonary embolism (H) 12/22/2023     Priority: Medium       PERTINENT PAST MEDICAL HISTORY:  Past Medical History:   Diagnosis Date    Heart disease July 2023    Hypertension     Thyroid disease        PREVIOUS SURGERIES:  Past Surgical History:   Procedure Laterality Date    COLONOSCOPY      ORTHOPEDIC SURGERY         PREVIOUS ENDOSCOPY:  Colonoscopies in outside facilities, no reports available    ALLERGIES:     Allergies   Allergen Reactions    Iodinated Contrast Media Hives    Iodine Hives    Latex Hives    Temazepam Unknown       PERTINENT MEDICATIONS:  Current Outpatient Medications   Medication Sig Dispense Refill    acetaminophen (TYLENOL) 500 MG tablet Take 500-1,000 mg by mouth every 6 hours as needed for mild pain      benzonatate (TESSALON) 200 MG capsule Take 1 capsule (200 mg) by mouth 3 times daily as needed for cough. 30 capsule 0    brimonidine (ALPHAGAN) 0.2 % ophthalmic solution Place 1 drop into both eyes 3 times daily      calcium carbonate (OS-TAMIKO) 1500 (600 Ca) MG tablet Take 600 mg by mouth 2 times daily (with meals)      cholecalciferol (VITAMIN D3) 25 mcg (1000 units) capsule Take 1 capsule by mouth daily Evening      coenzyme Q-10 capsule Take 1 capsule by mouth daily  120 mg      ELIQUIS ANTICOAGULANT 5 MG tablet Take 1 tablet (5 mg) by mouth 2 times daily 180 tablet 1    fenofibrate (TRIGLIDE/LOFIBRA) 160 MG tablet Take 1 tablet (160 mg) by mouth daily 90 tablet 3    fish oil-omega-3 fatty acids 500 MG capsule Take 1 capsule by mouth 2 times daily      latanoprost (XALATAN) 0.005 % ophthalmic solution Place 1 drop into both eyes daily      levothyroxine (SYNTHROID/LEVOTHROID) 50 MCG tablet Take 1 tablet (50 mcg) by mouth daily 90 tablet 1    metoprolol succinate ER (TOPROL XL) 50 MG 24 hr tablet Take 1 tablet (50 mg) by mouth daily. 90 tablet 3    multivitamin w/minerals  (THERA-VIT-M) tablet Take 1 tablet by mouth daily      netarsudil (RHOPRESSA) 0.02 % ophthalmic solution Place 1 drop into both eyes at bedtime      ofloxacin (OCUFLOX) 0.3 % ophthalmic solution       raloxifene (EVISTA) 60 MG tablet Take 1 tablet (60 mg) by mouth daily. 90 tablet 3    red yeast rice 600 MG CAPS Take 600 mg by mouth daily      traZODone (DESYREL) 150 MG tablet Take 2-3 tablets (300-450 mg) by mouth at bedtime. 90 tablet 1    traZODone (DESYREL) 150 MG tablet Take 2-3 tablets (300-450 mg) by mouth at bedtime 90 tablet 0    vitamin D3 (CHOLECALCIFEROL) 50 mcg (2000 units) tablet Take 1 tablet by mouth 2 times daily      Vitamin E 90 MG (200 UNIT) capsule Take 200 Units by mouth daily       No current facility-administered medications for this visit.       SOCIAL HISTORY:  Social History     Socioeconomic History    Marital status:      Spouse name: Not on file    Number of children: Not on file    Years of education: Not on file    Highest education level: Not on file   Occupational History    Not on file   Tobacco Use    Smoking status: Never     Passive exposure: Past    Smokeless tobacco: Never   Vaping Use    Vaping status: Never Used   Substance and Sexual Activity    Alcohol use: Not Currently    Drug use: Not Currently    Sexual activity: Not Currently     Birth control/protection: Post-menopausal   Other Topics Concern    Parent/sibling w/ CABG, MI or angioplasty before 65F 55M? No   Social History Narrative    Not on file     Social Drivers of Health     Financial Resource Strain: Low Risk  (9/27/2024)    Financial Resource Strain     Within the past 12 months, have you or your family members you live with been unable to get utilities (heat, electricity) when it was really needed?: No   Food Insecurity: Low Risk  (9/27/2024)    Food Insecurity     Within the past 12 months, did you worry that your food would run out before you got money to buy more?: No     Within the past 12 months,  "did the food you bought just not last and you didn t have money to get more?: No   Transportation Needs: Low Risk  (9/27/2024)    Transportation Needs     Within the past 12 months, has lack of transportation kept you from medical appointments, getting your medicines, non-medical meetings or appointments, work, or from getting things that you need?: No   Physical Activity: Unknown (9/27/2024)    Exercise Vital Sign     Days of Exercise per Week: 0 days     Minutes of Exercise per Session: Not on file   Stress: No Stress Concern Present (9/27/2024)    [employer] - Occupational Stress Questionnaire     Feeling of Stress : Only a little   Social Connections: Unknown (9/27/2024)    Social Connection and Isolation Panel [NHANES]     Frequency of Communication with Friends and Family: Not on file     Frequency of Social Gatherings with Friends and Family: Twice a week     Attends Yarsanism Services: Not on file     Active Member of Clubs or Organizations: Not on file     Attends Club or Organization Meetings: Not on file     Marital Status: Not on file   Interpersonal Safety: Not on file   Housing Stability: Low Risk  (9/27/2024)    Housing Stability     Do you have housing? : Yes     Are you worried about losing your housing?: No       FAMILY HISTORY:  Family History   Problem Relation Age of Onset    Hypertension Brother        Past/family/social history reviewed and no changes    PHYSICAL EXAMINATION:  Vitals BP (!) 144/66   Pulse 93   Ht 1.638 m (5' 4.5\")   Wt 78.1 kg (172 lb 1.6 oz)   LMP  (LMP Unknown)   SpO2 99%   BMI 29.08 kg/m     Wt   Wt Readings from Last 2 Encounters:   12/19/24 78.1 kg (172 lb 1.6 oz)   12/03/24 77.1 kg (170 lb)      Gen: Pt sitting up on exam table in NAD, interactive and cooperative on exam  HEENT: normocephalic, atraumatic, no scleral icterus  Neck: supple, no LAD, no thyromegaly  CV: regular rate and rhythm and normal S1 S2  Resp: clear to ausculation " bilaterally, normal respiratory effort  Abd: bowel sounds presents, soft. non-tender, non-distended, no masses or hepatosplenomegaly  Ext: No LLE  Skin: warm and dry, no rashes or ecchymoses  Psych: normal mood, normal affect, alert and oriented x3      PERTINENT STUDIES:    Office Visit on 10/01/2024   Component Date Value Ref Range Status    TSH 10/01/2024 2.97  0.30 - 4.20 uIU/mL Final    WBC Count 10/01/2024 8.3  4.0 - 11.0 10e3/uL Final    RBC Count 10/01/2024 4.58  3.80 - 5.20 10e6/uL Final    Hemoglobin 10/01/2024 15.8 (H)  11.7 - 15.7 g/dL Final    Hematocrit 10/01/2024 44.5  35.0 - 47.0 % Final    MCV 10/01/2024 97  78 - 100 fL Final    MCH 10/01/2024 34.5 (H)  26.5 - 33.0 pg Final    MCHC 10/01/2024 35.5  31.5 - 36.5 g/dL Final    RDW 10/01/2024 13.0  10.0 - 15.0 % Final    Platelet Count 10/01/2024 311  150 - 450 10e3/uL Final    Sodium 10/01/2024 135  135 - 145 mmol/L Final    Potassium 10/01/2024 4.1  3.4 - 5.3 mmol/L Final    Chloride 10/01/2024 99  98 - 107 mmol/L Final    Carbon Dioxide (CO2) 10/01/2024 24  22 - 29 mmol/L Final    Anion Gap 10/01/2024 12  7 - 15 mmol/L Final    Urea Nitrogen 10/01/2024 21.3  8.0 - 23.0 mg/dL Final    Creatinine 10/01/2024 0.88  0.51 - 0.95 mg/dL Final    GFR Estimate 10/01/2024 66  >60 mL/min/1.73m2 Final    Calcium 10/01/2024 10.4  8.8 - 10.4 mg/dL Final    Glucose 10/01/2024 96  70 - 99 mg/dL Final

## 2024-12-19 NOTE — NURSING NOTE
"Chief Complaint   Patient presents with    New Patient       Vitals:    12/19/24 1105   BP: (!) 144/66   Pulse: 93   SpO2: 99%   Weight: 78.1 kg (172 lb 1.6 oz)   Height: 1.638 m (5' 4.5\")       Body mass index is 29.08 kg/m .    Seble Torres MA    "

## 2025-01-13 NOTE — TELEPHONE ENCOUNTER
FAX [facility] Prescription Refill Request    traZODone (DESYREL) 150 MG tablet 90 tablet 0 5/15/2024 -- No   Sig - Route: Take 2-3 tablets (300-450 mg) by mouth at bedtime - Oral   Sent to pharmacy as: traZODone HCl 150 MG Oral Tablet (DESYREL)          98

## 2025-01-19 DIAGNOSIS — G47.00 INSOMNIA, UNSPECIFIED TYPE: ICD-10-CM

## 2025-01-19 NOTE — TELEPHONE ENCOUNTER
FAX [facility] Prescription Refill Request      Disp Refills Start End TJ    traZODone (DESYREL) 150 MG tablet 90 tablet 1 9/27/2024 -- No   Sig - Route: Take 2-3 tablets (300-450 mg) by mouth at bedtime. - Oral

## 2025-01-20 RX ORDER — TRAZODONE HYDROCHLORIDE 150 MG/1
300-450 TABLET ORAL AT BEDTIME
Qty: 90 TABLET | Refills: 1 | Status: SHIPPED | OUTPATIENT
Start: 2025-01-20

## 2025-02-20 DIAGNOSIS — E03.9 HYPOTHYROIDISM, UNSPECIFIED TYPE: ICD-10-CM

## 2025-02-20 RX ORDER — LEVOTHYROXINE SODIUM 50 UG/1
50 TABLET ORAL DAILY
Qty: 90 TABLET | Refills: 1 | Status: SHIPPED | OUTPATIENT
Start: 2025-02-20

## 2025-05-27 DIAGNOSIS — G47.00 INSOMNIA, UNSPECIFIED TYPE: ICD-10-CM

## 2025-05-27 RX ORDER — TRAZODONE HYDROCHLORIDE 150 MG/1
300-450 TABLET ORAL AT BEDTIME
Qty: 90 TABLET | Refills: 0 | Status: SHIPPED | OUTPATIENT
Start: 2025-05-27

## 2025-05-27 NOTE — TELEPHONE ENCOUNTER
FAX [facility] Prescription Refill Request 05/25/25 12:23 PM    Last Visit with PCP = 12/03/24  Next Visit with PCP = nothing scheduled    traZODone (DESYREL) 150 MG tablet 30 tablet 0 3/21/2025 -- No   Sig - Route: Take 1 tablet (150 mg) by mouth at bedtime. - Oral

## 2025-06-17 DIAGNOSIS — I48.0 PAROXYSMAL ATRIAL FIBRILLATION (H): ICD-10-CM

## 2025-06-17 RX ORDER — APIXABAN 5 MG/1
5 TABLET, FILM COATED ORAL
Qty: 180 TABLET | Refills: 0 | Status: SHIPPED | OUTPATIENT
Start: 2025-06-17

## 2025-06-27 ENCOUNTER — MYC MEDICAL ADVICE (OUTPATIENT)
Dept: FAMILY MEDICINE | Facility: CLINIC | Age: 80
End: 2025-06-27
Payer: COMMERCIAL

## 2025-06-27 DIAGNOSIS — G47.00 INSOMNIA, UNSPECIFIED TYPE: ICD-10-CM

## 2025-06-30 RX ORDER — TRAZODONE HYDROCHLORIDE 150 MG/1
300-450 TABLET ORAL AT BEDTIME
Qty: 270 TABLET | Refills: 0 | Status: SHIPPED | OUTPATIENT
Start: 2025-06-30

## 2025-08-26 DIAGNOSIS — E03.9 HYPOTHYROIDISM, UNSPECIFIED TYPE: ICD-10-CM

## 2025-08-26 DIAGNOSIS — E78.5 HYPERLIPIDEMIA LDL GOAL <70: ICD-10-CM

## 2025-08-26 RX ORDER — FENOFIBRATE 160 MG/1
160 TABLET ORAL DAILY
Qty: 90 TABLET | Refills: 3 | Status: SHIPPED | OUTPATIENT
Start: 2025-08-26

## 2025-08-26 RX ORDER — LEVOTHYROXINE SODIUM 50 UG/1
50 TABLET ORAL DAILY
Qty: 90 TABLET | Refills: 0 | Status: SHIPPED | OUTPATIENT
Start: 2025-08-26